# Patient Record
Sex: MALE | Race: WHITE | NOT HISPANIC OR LATINO | Employment: FULL TIME | URBAN - METROPOLITAN AREA
[De-identification: names, ages, dates, MRNs, and addresses within clinical notes are randomized per-mention and may not be internally consistent; named-entity substitution may affect disease eponyms.]

---

## 2021-09-30 ENCOUNTER — APPOINTMENT (OUTPATIENT)
Dept: CT IMAGING | Facility: CLINIC | Age: 46
End: 2021-09-30
Attending: PHYSICIAN ASSISTANT
Payer: COMMERCIAL

## 2021-09-30 ENCOUNTER — HOSPITAL ENCOUNTER (EMERGENCY)
Facility: CLINIC | Age: 46
Discharge: HOME | End: 2021-09-30
Attending: PHYSICIAN ASSISTANT | Admitting: PHYSICIAN ASSISTANT
Payer: COMMERCIAL

## 2021-09-30 VITALS
OXYGEN SATURATION: 98 % | RESPIRATION RATE: 18 BRPM | SYSTOLIC BLOOD PRESSURE: 139 MMHG | HEART RATE: 62 BPM | TEMPERATURE: 97.7 F | DIASTOLIC BLOOD PRESSURE: 83 MMHG

## 2021-09-30 DIAGNOSIS — N20.1 URETERAL STONE: ICD-10-CM

## 2021-09-30 LAB
ALBUMIN UR-MCNC: 10 MG/DL
ANION GAP SERPL CALCULATED.3IONS-SCNC: 2 MMOL/L (ref 3–14)
APPEARANCE UR: CLEAR
BASOPHILS # BLD AUTO: 0 10E3/UL (ref 0–0.2)
BASOPHILS NFR BLD AUTO: 0 %
BILIRUB UR QL STRIP: NEGATIVE
BUN SERPL-MCNC: 20 MG/DL (ref 7–30)
CALCIUM SERPL-MCNC: 9.3 MG/DL (ref 8.5–10.1)
CHLORIDE BLD-SCNC: 106 MMOL/L (ref 94–109)
CO2 SERPL-SCNC: 32 MMOL/L (ref 20–32)
COLOR UR AUTO: YELLOW
CREAT SERPL-MCNC: 1.31 MG/DL (ref 0.66–1.25)
EOSINOPHIL # BLD AUTO: 0 10E3/UL (ref 0–0.7)
EOSINOPHIL NFR BLD AUTO: 0 %
ERYTHROCYTE [DISTWIDTH] IN BLOOD BY AUTOMATED COUNT: 12.3 % (ref 10–15)
GFR SERPL CREATININE-BSD FRML MDRD: 64 ML/MIN/1.73M2
GLUCOSE BLD-MCNC: 127 MG/DL (ref 70–99)
GLUCOSE UR STRIP-MCNC: NEGATIVE MG/DL
HCT VFR BLD AUTO: 46.6 % (ref 40–53)
HGB BLD-MCNC: 15.4 G/DL (ref 13.3–17.7)
HGB UR QL STRIP: ABNORMAL
HOLD SPECIMEN: NORMAL
HOLD SPECIMEN: NORMAL
HYALINE CASTS: 1 /LPF
IMM GRANULOCYTES # BLD: 0 10E3/UL
IMM GRANULOCYTES NFR BLD: 0 %
KETONES UR STRIP-MCNC: NEGATIVE MG/DL
LEUKOCYTE ESTERASE UR QL STRIP: NEGATIVE
LYMPHOCYTES # BLD AUTO: 0.8 10E3/UL (ref 0.8–5.3)
LYMPHOCYTES NFR BLD AUTO: 7 %
MCH RBC QN AUTO: 30.7 PG (ref 26.5–33)
MCHC RBC AUTO-ENTMCNC: 33 G/DL (ref 31.5–36.5)
MCV RBC AUTO: 93 FL (ref 78–100)
MONOCYTES # BLD AUTO: 0.8 10E3/UL (ref 0–1.3)
MONOCYTES NFR BLD AUTO: 7 %
MUCOUS THREADS #/AREA URNS LPF: PRESENT /LPF
NEUTROPHILS # BLD AUTO: 9.1 10E3/UL (ref 1.6–8.3)
NEUTROPHILS NFR BLD AUTO: 86 %
NITRATE UR QL: NEGATIVE
NRBC # BLD AUTO: 0 10E3/UL
NRBC BLD AUTO-RTO: 0 /100
PH UR STRIP: 6.5 [PH] (ref 5–7)
PLATELET # BLD AUTO: 263 10E3/UL (ref 150–450)
POTASSIUM BLD-SCNC: 4.1 MMOL/L (ref 3.4–5.3)
RBC # BLD AUTO: 5.01 10E6/UL (ref 4.4–5.9)
RBC URINE: 148 /HPF
SODIUM SERPL-SCNC: 140 MMOL/L (ref 133–144)
SP GR UR STRIP: 1.02 (ref 1–1.03)
UROBILINOGEN UR STRIP-MCNC: NORMAL MG/DL
WBC # BLD AUTO: 10.7 10E3/UL (ref 4–11)
WBC URINE: 4 /HPF

## 2021-09-30 PROCEDURE — 85025 COMPLETE CBC W/AUTO DIFF WBC: CPT | Performed by: EMERGENCY MEDICINE

## 2021-09-30 PROCEDURE — 81001 URINALYSIS AUTO W/SCOPE: CPT | Performed by: PHYSICIAN ASSISTANT

## 2021-09-30 PROCEDURE — 36415 COLL VENOUS BLD VENIPUNCTURE: CPT | Performed by: EMERGENCY MEDICINE

## 2021-09-30 PROCEDURE — 258N000003 HC RX IP 258 OP 636: Performed by: PHYSICIAN ASSISTANT

## 2021-09-30 PROCEDURE — 85025 COMPLETE CBC W/AUTO DIFF WBC: CPT | Performed by: PHYSICIAN ASSISTANT

## 2021-09-30 PROCEDURE — 80048 BASIC METABOLIC PNL TOTAL CA: CPT | Performed by: EMERGENCY MEDICINE

## 2021-09-30 PROCEDURE — 74176 CT ABD & PELVIS W/O CONTRAST: CPT

## 2021-09-30 PROCEDURE — 80048 BASIC METABOLIC PNL TOTAL CA: CPT | Performed by: PHYSICIAN ASSISTANT

## 2021-09-30 PROCEDURE — 96361 HYDRATE IV INFUSION ADD-ON: CPT

## 2021-09-30 PROCEDURE — 96374 THER/PROPH/DIAG INJ IV PUSH: CPT

## 2021-09-30 PROCEDURE — 81001 URINALYSIS AUTO W/SCOPE: CPT | Performed by: EMERGENCY MEDICINE

## 2021-09-30 PROCEDURE — 99284 EMERGENCY DEPT VISIT MOD MDM: CPT | Mod: 25

## 2021-09-30 PROCEDURE — 250N000011 HC RX IP 250 OP 636: Performed by: PHYSICIAN ASSISTANT

## 2021-09-30 RX ORDER — HYDROCODONE BITARTRATE AND ACETAMINOPHEN 5; 325 MG/1; MG/1
1 TABLET ORAL EVERY 6 HOURS PRN
Qty: 10 TABLET | Refills: 0 | Status: SHIPPED | OUTPATIENT
Start: 2021-09-30 | End: 2021-10-03

## 2021-09-30 RX ORDER — KETOROLAC TROMETHAMINE 15 MG/ML
15 INJECTION, SOLUTION INTRAMUSCULAR; INTRAVENOUS ONCE
Status: COMPLETED | OUTPATIENT
Start: 2021-09-30 | End: 2021-09-30

## 2021-09-30 RX ORDER — KETOROLAC TROMETHAMINE 10 MG/1
10 TABLET, FILM COATED ORAL EVERY 6 HOURS PRN
Qty: 15 TABLET | Refills: 0 | Status: SHIPPED | OUTPATIENT
Start: 2021-09-30 | End: 2022-05-09

## 2021-09-30 RX ORDER — TAMSULOSIN HYDROCHLORIDE 0.4 MG/1
0.4 CAPSULE ORAL DAILY
Qty: 10 CAPSULE | Refills: 0 | Status: SHIPPED | OUTPATIENT
Start: 2021-09-30 | End: 2021-10-06

## 2021-09-30 RX ORDER — ONDANSETRON 4 MG/1
4 TABLET, ORALLY DISINTEGRATING ORAL EVERY 8 HOURS PRN
Qty: 10 TABLET | Refills: 0 | Status: SHIPPED | OUTPATIENT
Start: 2021-09-30 | End: 2022-05-09

## 2021-09-30 RX ADMIN — KETOROLAC TROMETHAMINE 15 MG: 15 INJECTION, SOLUTION INTRAMUSCULAR; INTRAVENOUS at 16:59

## 2021-09-30 RX ADMIN — SODIUM CHLORIDE 1000 ML: 9 INJECTION, SOLUTION INTRAVENOUS at 16:59

## 2021-09-30 ASSESSMENT — ENCOUNTER SYMPTOMS
FEVER: 0
ABDOMINAL PAIN: 1
FLANK PAIN: 0
CHILLS: 0
DYSURIA: 0
FREQUENCY: 0

## 2021-09-30 NOTE — H&P (VIEW-ONLY)
History   Chief Complaint:  Abdominal Pain       HPI   Jewel Madsen is a 46 year old male who presents with abdominal pain. The patient reports sharp right sided abdominal pain that started at 1100 today. At 1300 he took Miralax with no relief. Patient has no history of kidney stones. Denies any fever, chills, scrotal pain, penile pain, flank pain, or urinary symptoms.    Review of Systems   Constitutional: Negative for chills and fever.   Gastrointestinal: Positive for abdominal pain.   Genitourinary: Negative for dysuria, flank pain, frequency, penile pain and testicular pain.   All other systems reviewed and are negative.    Allergies:  The patient has no known allergies.     Medications:  The patient is currently on no regular medications.    Past Medical History:    The patient denies any significant past medical history.    Family History:    Mother: HTN    Social History:  The patient was accompanied to the ED by spouse.  Marital status:     Physical Exam     Patient Vitals for the past 24 hrs:   BP Temp Temp src Pulse Resp SpO2   09/30/21 1800 139/83 -- -- -- -- 98 %   09/30/21 1700 (!) 161/100 -- -- 62 -- 99 %   09/30/21 1655 (!) 166/102 -- -- -- -- --   09/30/21 1455 (!) 159/107 97.7  F (36.5  C) Oral 70 18 99 %       Physical Exam  General: Alert and interactive. Appears well. Cooperative and pleasant.   Eyes: The pupils are equal and round. EOMs intact. No scleral icterus.  ENT: No abnormalities to the external nose or ears. Mucous membranes moist. Posterior oropharynx is non-erythematous.  Neck: Trachea is in the midline. No nuchal rigidity.     CV: Regular rate and rhythm. S1 and S2 normal without murmur, click, gallop or rub.   Resp: Breath sounds are clear bilaterally, without rhonchi, wheezes, rales. Non-labored, no retractions or accessory muscle use.     GI: Abdomen is soft without distension. No tenderness to palpation. No peritoneal signs. No CVA tenderness.   MS: Moving all  extremities well. Good muscle tone.   Skin: Warm and dry. No rash or lesions noted.  Neuro: Alert and oriented x 3. No focal neurologic deficits. Good strength and sensation in upper and lower extremities. Psych: Awake. Alert.  Normal affect. Appropriate interactions.  Lymph: No anterior or posterior cervical lymphadenopathy noted.    Emergency Department Course     Imaging:  Abd/pelvis CT no contrast - Stone Protocol  1.  Large 1.0 x 0.7 CM stone in the proximal right ureter near the UPJ with moderate to severe right-sided hydronephrosis. Additional 5 mm stone lower pole right kidney with tiny punctate stone upper pole right kidney. 2 small nonobstructing stones   within the left kidney.    Laboratory:  CBC: WBC: 10.7, HGB: 15.4, PLT: 263  BMP: Anion Gap: 2 (L), Creatinine: 1.31 (H), glucose 127 (H) o/w WNL  UA: Blood: moderate,  Protein Albumin: 10 (!),  RBC: 148 (H),  Mucous: Present,  o/w WNL       Emergency Department Course:    Reviewed:  I reviewed the patient's nursing notes, vitals, past medical history and care everywhere.     Assessments:  1750 I obtained history and examined the patient as noted above.     Interventions:  1659 0.9% NS 1000 mL IV  1659 Toradol 15 mg IV    Disposition:  The patient was discharged to home.     Impression & Plan   CMS Diagnoses: None    Medical Decision Making:  Jewel Madsen is a 46 year old male who presented with abdominal pain consistent with renal colic. Patient had actually already been treated with Toradol when I saw him and was chest pain free. CT confirms a large 1.0 x 0.7 cm stone in the proximal right ureter near the UPJ and additional 5 mm stone lower pole right kidney.  Renal function is barely elevated at 1.31. CT and lab workup show no other alternative etiology that could be causing his symptoms (e.g., AAA, appendicitis, pyelonephritis). There is no fever or convincing evidence of a urinary tract infection. Hs pain is controlled with interventions in the ED  and he is tolerating POs. We discussed options, including admission for pain management and urologic intervention, or follow up with urology as an outpatient, and he much prefers the latter. I will prescribe supportive medications and Flomax to facilitate stone passage. I have advised him to return for uncontrolled pain, vomiting, fever, or any other concerning symptoms. I also advised to strain his urine to look for a stone and submit it to his primary doctor for lab analysis.  Finally, I have advised follow up with urology within 3-5 days. Questions were answered.      Diagnosis:    ICD-10-CM    1. Ureteral stone  N20.1 Adult Urology Referral    Large stone in right UPJ       Discharge Medications:  New Prescriptions    HYDROCODONE-ACETAMINOPHEN (NORCO) 5-325 MG TABLET    Take 1 tablet by mouth every 6 hours as needed for severe pain    KETOROLAC (TORADOL) 10 MG TABLET    Take 1 tablet (10 mg) by mouth every 6 hours as needed for moderate pain    ONDANSETRON (ZOFRAN ODT) 4 MG ODT TAB    Take 1 tablet (4 mg) by mouth every 8 hours as needed for nausea    TAMSULOSIN (FLOMAX) 0.4 MG CAPSULE    Take 1 capsule (0.4 mg) by mouth daily for 10 doses       Scribe Disclosure:  I, Jes Pichardo, am serving as a scribe at 5:55 PM on 9/30/2021 to document services personally performed by Yasmin Seaman PA-C based on my observations and the provider's statements to me.         Yasmin Seaman PA-C  09/30/21 [id number]

## 2021-09-30 NOTE — DISCHARGE INSTRUCTIONS
Take Toradol every six hours for pain.   Take Flomax daily to help passage of stone.   Take Zofran for nausea.   Use Norco for extreme pain.   Call urology ASAP for follow up.   Return immediately for fever/chills.   Discharge Instructions  Kidney Stones    Kidney stones are a common problem that can cause a lot of pain but fortunately are usually not dangerous. Kidney stones form in the kidney and then can cause a blockage (obstruction) of the flow of urine from the kidney which leads to pain. Most patients can manage kidney stones at home (without a hospital stay).  However, sometimes your condition may be worse than it seemed at first, or may get worse with time. Most kidney stones will pass on their own, but occasionally stones may need to be removed by an urologist.    Generally, every Emergency Department visit should have a follow-up clinic visit with either a primary or a specialty clinic/provider. Please follow-up as instructed by your emergency provider today.      Return to the Emergency Department if:  Your pain is not controlled despite the medications provided or recommended.  You are vomiting (throwing up) and cannot keep fluids or medications down.  You develop a fever (>100.4 F).  You feel much more ill or develop new symptoms.  What can I do to help myself?  Be sure to drink plenty of fluids.  If instructed to do so, strain your urine (pee) with the urine strainer you were provided with today. Your stone may look like a grain of sand or a small pebble. Collect any stones in the cup provided and bring to your follow-up appointment.  Staying active is good, and may help the stone to pass. You may do whatever you feel up to doing without restrictions.   Treatment:  Non-steroidal anti-inflammatory drugs (NSAIDs). This includes prescription medicines like Toradol  (ketorolac) and non-prescription medicines like Advil  (ibuprofen) and Nuprin  (ibuprofen) and Naproxen. These pain relievers are very  effective for kidney stones.  Nausea (sick to your stomach) medication.  Nausea and vomiting are common with kidney stones, so your provider may send you home with medicine for this.   Flomax  (tamsulosin). This medicine is sometimes used for men with prostate problems, but also can help kidney stones to pass. Its effectiveness is controversial or questionable so it is prescribed in certain situations. This medicine can lower blood pressure, and you may feel faint/lightheaded, especially when you first stand up. Be sure to get up gradually, sit down if you feel faint, and avoid activity where feeling faint would be dangerous, such as climbing ladders.  If you were given a prescription for medicine here today, be sure to read all of the information (including the package insert) that comes with your prescription.  This will include important information about the medicine, its side effects, and any warnings that you need to know about.  The pharmacist who fills the prescription can provide more information and answer questions you may have about the medicine.  If you have questions or concerns that the pharmacist cannot address, please call or return to the Emergency Department.   Remember that you can always come back to the Emergency Department if you are not able to see your regular provider in the amount of time listed above, if you get any new symptoms, or if there is anything that worries you.

## 2021-09-30 NOTE — ED PROVIDER NOTES
History   Chief Complaint:  Abdominal Pain       HPI   Jewel Madsen is a 46 year old male who presents with abdominal pain. The patient reports sharp right sided abdominal pain that started at 1100 today. At 1300 he took Miralax with no relief. Patient has no history of kidney stones. Denies any fever, chills, scrotal pain, penile pain, flank pain, or urinary symptoms.    Review of Systems   Constitutional: Negative for chills and fever.   Gastrointestinal: Positive for abdominal pain.   Genitourinary: Negative for dysuria, flank pain, frequency, penile pain and testicular pain.   All other systems reviewed and are negative.    Allergies:  The patient has no known allergies.     Medications:  The patient is currently on no regular medications.    Past Medical History:    The patient denies any significant past medical history.    Family History:    Mother: HTN    Social History:  The patient was accompanied to the ED by spouse.  Marital status:     Physical Exam     Patient Vitals for the past 24 hrs:   BP Temp Temp src Pulse Resp SpO2   09/30/21 1800 139/83 -- -- -- -- 98 %   09/30/21 1700 (!) 161/100 -- -- 62 -- 99 %   09/30/21 1655 (!) 166/102 -- -- -- -- --   09/30/21 1455 (!) 159/107 97.7  F (36.5  C) Oral 70 18 99 %       Physical Exam  General: Alert and interactive. Appears well. Cooperative and pleasant.   Eyes: The pupils are equal and round. EOMs intact. No scleral icterus.  ENT: No abnormalities to the external nose or ears. Mucous membranes moist. Posterior oropharynx is non-erythematous.  Neck: Trachea is in the midline. No nuchal rigidity.     CV: Regular rate and rhythm. S1 and S2 normal without murmur, click, gallop or rub.   Resp: Breath sounds are clear bilaterally, without rhonchi, wheezes, rales. Non-labored, no retractions or accessory muscle use.     GI: Abdomen is soft without distension. No tenderness to palpation. No peritoneal signs. No CVA tenderness.   MS: Moving all  extremities well. Good muscle tone.   Skin: Warm and dry. No rash or lesions noted.  Neuro: Alert and oriented x 3. No focal neurologic deficits. Good strength and sensation in upper and lower extremities. Psych: Awake. Alert.  Normal affect. Appropriate interactions.  Lymph: No anterior or posterior cervical lymphadenopathy noted.    Emergency Department Course     Imaging:  Abd/pelvis CT no contrast - Stone Protocol  1.  Large 1.0 x 0.7 CM stone in the proximal right ureter near the UPJ with moderate to severe right-sided hydronephrosis. Additional 5 mm stone lower pole right kidney with tiny punctate stone upper pole right kidney. 2 small nonobstructing stones   within the left kidney.    Laboratory:  CBC: WBC: 10.7, HGB: 15.4, PLT: 263  BMP: Anion Gap: 2 (L), Creatinine: 1.31 (H), glucose 127 (H) o/w WNL  UA: Blood: moderate,  Protein Albumin: 10 (!),  RBC: 148 (H),  Mucous: Present,  o/w WNL       Emergency Department Course:    Reviewed:  I reviewed the patient's nursing notes, vitals, past medical history and care everywhere.     Assessments:  1750 I obtained history and examined the patient as noted above.     Interventions:  1659 0.9% NS 1000 mL IV  1659 Toradol 15 mg IV    Disposition:  The patient was discharged to home.     Impression & Plan   CMS Diagnoses: None    Medical Decision Making:  Jewel Madsen is a 46 year old male who presented with abdominal pain consistent with renal colic. Patient had actually already been treated with Toradol when I saw him and was chest pain free. CT confirms a large 1.0 x 0.7 cm stone in the proximal right ureter near the UPJ and additional 5 mm stone lower pole right kidney.  Renal function is barely elevated at 1.31. CT and lab workup show no other alternative etiology that could be causing his symptoms (e.g., AAA, appendicitis, pyelonephritis). There is no fever or convincing evidence of a urinary tract infection. Hs pain is controlled with interventions in the ED  and he is tolerating POs. We discussed options, including admission for pain management and urologic intervention, or follow up with urology as an outpatient, and he much prefers the latter. I will prescribe supportive medications and Flomax to facilitate stone passage. I have advised him to return for uncontrolled pain, vomiting, fever, or any other concerning symptoms. I also advised to strain his urine to look for a stone and submit it to his primary doctor for lab analysis.  Finally, I have advised follow up with urology within 3-5 days. Questions were answered.      Diagnosis:    ICD-10-CM    1. Ureteral stone  N20.1 Adult Urology Referral    Large stone in right UPJ       Discharge Medications:  New Prescriptions    HYDROCODONE-ACETAMINOPHEN (NORCO) 5-325 MG TABLET    Take 1 tablet by mouth every 6 hours as needed for severe pain    KETOROLAC (TORADOL) 10 MG TABLET    Take 1 tablet (10 mg) by mouth every 6 hours as needed for moderate pain    ONDANSETRON (ZOFRAN ODT) 4 MG ODT TAB    Take 1 tablet (4 mg) by mouth every 8 hours as needed for nausea    TAMSULOSIN (FLOMAX) 0.4 MG CAPSULE    Take 1 capsule (0.4 mg) by mouth daily for 10 doses       Scribe Disclosure:  I, Jes Pichardo, am serving as a scribe at 5:55 PM on 9/30/2021 to document services personally performed by Yasmin Seaman PA-C based on my observations and the provider's statements to me.         Yasmin Seaman PA-C  09/30/21 [id number]

## 2021-10-04 ENCOUNTER — OFFICE VISIT (OUTPATIENT)
Dept: UROLOGY | Facility: CLINIC | Age: 46
End: 2021-10-04
Payer: COMMERCIAL

## 2021-10-04 ENCOUNTER — LAB (OUTPATIENT)
Dept: URGENT CARE | Facility: URGENT CARE | Age: 46
End: 2021-10-04
Attending: UROLOGY
Payer: COMMERCIAL

## 2021-10-04 ENCOUNTER — TELEPHONE (OUTPATIENT)
Dept: UROLOGY | Facility: CLINIC | Age: 46
End: 2021-10-04

## 2021-10-04 VITALS
HEART RATE: 62 BPM | WEIGHT: 220 LBS | DIASTOLIC BLOOD PRESSURE: 80 MMHG | SYSTOLIC BLOOD PRESSURE: 140 MMHG | BODY MASS INDEX: 29.8 KG/M2 | HEIGHT: 72 IN

## 2021-10-04 DIAGNOSIS — N20.1 URETERAL STONE: ICD-10-CM

## 2021-10-04 DIAGNOSIS — N20.0 CALCULUS OF KIDNEY: Primary | ICD-10-CM

## 2021-10-04 DIAGNOSIS — N20.0 CALCULUS OF KIDNEY: ICD-10-CM

## 2021-10-04 LAB
ALBUMIN UR-MCNC: NEGATIVE MG/DL
APPEARANCE UR: CLEAR
BILIRUB UR QL STRIP: NEGATIVE
COLOR UR AUTO: YELLOW
GLUCOSE UR STRIP-MCNC: NEGATIVE MG/DL
HGB UR QL STRIP: ABNORMAL
KETONES UR STRIP-MCNC: NEGATIVE MG/DL
LEUKOCYTE ESTERASE UR QL STRIP: ABNORMAL
NITRATE UR QL: NEGATIVE
PH UR STRIP: 5.5 [PH] (ref 5–7)
SARS-COV-2 RNA RESP QL NAA+PROBE: NEGATIVE
SP GR UR STRIP: 1.02 (ref 1–1.03)
UROBILINOGEN UR STRIP-ACNC: 0.2 E.U./DL

## 2021-10-04 PROCEDURE — U0003 INFECTIOUS AGENT DETECTION BY NUCLEIC ACID (DNA OR RNA); SEVERE ACUTE RESPIRATORY SYNDROME CORONAVIRUS 2 (SARS-COV-2) (CORONAVIRUS DISEASE [COVID-19]), AMPLIFIED PROBE TECHNIQUE, MAKING USE OF HIGH THROUGHPUT TECHNOLOGIES AS DESCRIBED BY CMS-2020-01-R: HCPCS

## 2021-10-04 PROCEDURE — 87086 URINE CULTURE/COLONY COUNT: CPT | Performed by: UROLOGY

## 2021-10-04 PROCEDURE — U0005 INFEC AGEN DETEC AMPLI PROBE: HCPCS

## 2021-10-04 PROCEDURE — 81003 URINALYSIS AUTO W/O SCOPE: CPT | Mod: QW | Performed by: UROLOGY

## 2021-10-04 PROCEDURE — 99204 OFFICE O/P NEW MOD 45 MIN: CPT | Performed by: UROLOGY

## 2021-10-04 RX ORDER — NITROFURANTOIN 25; 75 MG/1; MG/1
100 CAPSULE ORAL 2 TIMES DAILY
Qty: 14 CAPSULE | Refills: 0 | Status: SHIPPED | OUTPATIENT
Start: 2021-10-04 | End: 2021-10-11

## 2021-10-04 ASSESSMENT — MIFFLIN-ST. JEOR: SCORE: 1910.91

## 2021-10-04 ASSESSMENT — PAIN SCALES - GENERAL: PAINLEVEL: MILD PAIN (2)

## 2021-10-04 NOTE — PROGRESS NOTES
Name: Jewel Madsen   MRN: [id number]  YOB: 1975    Assessment and Plan:  46 year old male with a large right ureteral stone that is unlikely to pass.     1. Ureteral stone  - Case Request: CYSTOURETEROSCOPY, WITH LITHOTRIPSY USING LASER AND URETERAL STENT INSERTION; Standing  - Case Request: CYSTOURETEROSCOPY, WITH LITHOTRIPSY USING LASER AND URETERAL STENT INSERTION  - Asymptomatic COVID-19 Virus (Coronavirus) by PCR; Future  - nitroFURantoin macrocrystal-monohydrate (MACROBID) 100 MG capsule; Take 1 capsule (100 mg) by mouth 2 times daily for 7 days  Dispense: 14 capsule; Refill: 0  - Urine Culture Aerobic Bacterial; Future  - Urine Culture Aerobic Bacterial  2. Calculus of kidney  Discussed the options with the patient including trial of passage, shockwave lithotripsy and ureteroscopy.  Discussed pros and cons of all these approaches.  Discussed with patient that rate of stone passage would be low for the stone.  Regarding ureteroscopy, Discussed risks, including but not limited to, bleeding, infection, need for stent/stent related symptoms, injury to ureter, need for second procedure.     Plan:  right ureteroscopy with laser lithotripsy and stent placement [place] [facility] [place]  Urine culture today  Start prophylactic Macrobid today    Total time spent of 45 minutes including patient time, coordination of care, chart review, documentation.         Chief Complaint: 1 cm right ureteral stone    History of Present Illness:  Jewel Madsen is a 46 year old male seen in consultation from Dr. Seaman for discussion his renal stones.      The current episode was first found due to right renal colic.  This led to evaluation in the in the emergency department which demonstrated a 7 x 10 mm proximal right ureteral stone with significant upstream hydronephrosis and perinephric stranding..  Imaging (CT scan without contrast) was performed on September 30 (images personally reviewed) which  demonstrated:  Right Kidney: 7 x 10 mm ureteral stone in the proximal ureter, 6 mm lower pole nonobstructing stone  Left Kidney: Two 1 to 2 mm calyceal tip stones  Additional relevant findings: None    Currently, they are experiencing minimal flank pain, no nausea, no vomiting, no hematuria, or no dysuria.  They have not experienced recent stone passage.  They have required no time off work due to current stone episode. They are currently utilizing the following medications for pain: Tamsulosin.        I reviewed internal records which in summarized above.    I reviewed internal labs, of which pertinent ones include: Hgb 15.4, Cr 1.31, Ca 9.3, urine pH 5.5, leukocyte esterase trace, significant blood on dipstick analysis    Pertinent stone history:  -- Personal stone history  -- Prior stone procedure  -- Prior stone analysis  -- Prior metabolic evaluation  -- Family stone history    Pertinent stone medical history  -- Gastric bypass surgery  -- Sarcoidosis  -- Inflammatory bowel disease  -- History of non-stone  surgery   -- Neurologic disease limiting mobility  -- Osteoporosis  -- Diabetes  -- Gout    Pertinent medications:  -- Antiplatelet  -- Anticoagulant  -- Topiramate   -- Thiazaide  -- Potassium supplement         Past Medical History:  History reviewed. No pertinent past medical history.         Past Surgical History:  Past Surgical History:   Procedure Laterality Date     NOSE SURGERY       TOE SURGERY              Social History:  Social History     Tobacco Use     Smoking status: Never Smoker     Smokeless tobacco: Never Used   Substance Use Topics     Alcohol use: Yes     Comment: ocass     Drug use: Never            Family History:  Family History   Problem Relation Age of Onset     Hypertension Mother      Cancer Maternal Grandmother      Cancer Maternal Grandfather             Allergies:  No Known Allergies         Medications:  Current Outpatient Medications   Medication Sig     ketorolac (TORADOL)  10 MG tablet Take 1 tablet (10 mg) by mouth every 6 hours as needed for moderate pain     tamsulosin (FLOMAX) 0.4 MG capsule Take 1 capsule (0.4 mg) by mouth daily for 10 doses     ondansetron (ZOFRAN ODT) 4 MG ODT tab Take 1 tablet (4 mg) by mouth every 8 hours as needed for nausea (Patient not taking: Reported on 10/4/2021)     No current facility-administered medications for this visit.       Review of Systems:   ROS: 10 point ROS neg other than the symptoms noted above in the HPI.    Physical Exam:  B/P: 140/80, T: Data Unavailable, P: 62, R: Data Unavailable  Estimated body mass index is 29.84 kg/m  as calculated from the following:    Height as of this encounter: 1.829 m (6').    Weight as of this encounter: 99.8 kg (220 lb).  General: age-appropriate appearing male in NAD.  Resp: no respiratory distress and lung sounds clear.  Back: bony spine is non-tender, flank tenderness: mild on right  Abdomen: no obesity, soft, non- distended, non- tender. No organomegaly.   Neuro: Gross motor intact  Skin: clear of rashes or ecchymoses.     Labs:     Creatinine   Date Value Ref Range Status   09/30/2021 1.31 (H) 0.66 - 1.25 mg/dL Final   03/10/2010 1.09 0.66 - 1.25 mg/dL Final     Comment:     New IDMS-traceable calibration  beginning 5/1/08     No results found for: UA  No components found for: BIJAL Meléndez MD  October 4, 2021

## 2021-10-04 NOTE — CONFIDENTIAL NOTE
Patient is scheduled for surgery with Dr. Meléndez    Spoke with: Dr. Meléndez spoke to patient when he was in clinic face to face 10/04/21    Date of Surgery: Wednesday 10/06/21    Location: [facility]    Informed patient they will need an adult  Yes    Pre op with Provider fernanda    H&P: Scheduled with patient was in ER 09/30/21, Dr. Meléndez will update the H&P day of surgery if needed.     Pre-procedure COVID-19 Test: Tuesday 10/05/21 [facility]     Additional imaging/appointments: na    Surgery packet: optifreight to patient 10/04/21.     Additional comments: na

## 2021-10-04 NOTE — NURSING NOTE
Was seen in Ed last Thursday pm  Dx with kidney stone. No pain at this time . Om Flomax but will need a refill

## 2021-10-04 NOTE — LETTER
10/4/2021       RE: Jewel Madsen  [address]  [facility] [address]     Dear Colleague,    Thank you for referring your patient, Jewel Madsen, to the [facility] [place] at [facility]. Please see a copy of my visit note below.    Name: Jewel Madsen   MRN: [id number]  YOB: 1975    Assessment and Plan:  46 year old male with a large right ureteral stone that is unlikely to pass.     1. Ureteral stone  - Case Request: CYSTOURETEROSCOPY, WITH LITHOTRIPSY USING LASER AND URETERAL STENT INSERTION; Standing  - Case Request: CYSTOURETEROSCOPY, WITH LITHOTRIPSY USING LASER AND URETERAL STENT INSERTION  - Asymptomatic COVID-19 Virus (Coronavirus) by PCR; Future  - nitroFURantoin macrocrystal-monohydrate (MACROBID) 100 MG capsule; Take 1 capsule (100 mg) by mouth 2 times daily for 7 days  Dispense: 14 capsule; Refill: 0  - Urine Culture Aerobic Bacterial; Future  - Urine Culture Aerobic Bacterial  2. Calculus of kidney  Discussed the options with the patient including trial of passage, shockwave lithotripsy and ureteroscopy.  Discussed pros and cons of all these approaches.  Discussed with patient that rate of stone passage would be low for the stone.  Regarding ureteroscopy, Discussed risks, including but not limited to, bleeding, infection, need for stent/stent related symptoms, injury to ureter, need for second procedure.     Plan:  right ureteroscopy with laser lithotripsy and stent placement on Wednesday at the [facility] [place].  Urine culture today  Start prophylactic Macrobid today    Total time spent of 45 minutes including patient time, coordination of care, chart review, documentation.         Chief Complaint: 1 cm right ureteral stone    History of Present Illness:  Jewel Madsen is a 46 year old male seen in consultation from Dr. Seaman for discussion his renal stones.      The current episode was first found due to right renal colic.   This led to evaluation in the in the emergency department which demonstrated a 7 x 10 mm proximal right ureteral stone with significant upstream hydronephrosis and perinephric stranding..  Imaging (CT scan without contrast) was performed on September 30 (images personally reviewed) which demonstrated:  Right Kidney: 7 x 10 mm ureteral stone in the proximal ureter, 6 mm lower pole nonobstructing stone  Left Kidney: Two 1 to 2 mm calyceal tip stones  Additional relevant findings: None    Currently, they are experiencing minimal flank pain, no nausea, no vomiting, no hematuria, or no dysuria.  They have not experienced recent stone passage.  They have required no time off work due to current stone episode. They are currently utilizing the following medications for pain: Tamsulosin.        I reviewed internal records which in summarized above.    I reviewed internal labs, of which pertinent ones include: Hgb 15.4, Cr 1.31, Ca 9.3, urine pH 5.5, leukocyte esterase trace, significant blood on dipstick analysis    Pertinent stone history:  -- Personal stone history  -- Prior stone procedure  -- Prior stone analysis  -- Prior metabolic evaluation  -- Family stone history    Pertinent stone medical history  -- Gastric bypass surgery  -- Sarcoidosis  -- Inflammatory bowel disease  -- History of non-stone  surgery   -- Neurologic disease limiting mobility  -- Osteoporosis  -- Diabetes  -- Gout    Pertinent medications:  -- Antiplatelet  -- Anticoagulant  -- Topiramate   -- Thiazaide  -- Potassium supplement         Past Medical History:  History reviewed. No pertinent past medical history.         Past Surgical History:  Past Surgical History:   Procedure Laterality Date     NOSE SURGERY       TOE SURGERY              Social History:  Social History     Tobacco Use     Smoking status: Never Smoker     Smokeless tobacco: Never Used   Substance Use Topics     Alcohol use: Yes     Comment: ocass     Drug use: Never             Family History:  Family History   Problem Relation Age of Onset     Hypertension Mother      Cancer Maternal Grandmother      Cancer Maternal Grandfather             Allergies:  No Known Allergies         Medications:  Current Outpatient Medications   Medication Sig     ketorolac (TORADOL) 10 MG tablet Take 1 tablet (10 mg) by mouth every 6 hours as needed for moderate pain     tamsulosin (FLOMAX) 0.4 MG capsule Take 1 capsule (0.4 mg) by mouth daily for 10 doses     ondansetron (ZOFRAN ODT) 4 MG ODT tab Take 1 tablet (4 mg) by mouth every 8 hours as needed for nausea (Patient not taking: Reported on 10/4/2021)     No current facility-administered medications for this visit.       Review of Systems:   ROS: 10 point ROS neg other than the symptoms noted above in the HPI.    Physical Exam:  B/P: 140/80, T: Data Unavailable, P: 62, R: Data Unavailable  Estimated body mass index is 29.84 kg/m  as calculated from the following:    Height as of this encounter: 1.829 m (6').    Weight as of this encounter: 99.8 kg (220 lb).  General: age-appropriate appearing male in NAD.  Resp: no respiratory distress and lung sounds clear.  Back: bony spine is non-tender, flank tenderness: mild on right  Abdomen: no obesity, soft, non- distended, non- tender. No organomegaly.   Neuro: Gross motor intact  Skin: clear of rashes or ecchymoses.     Labs:     Creatinine   Date Value Ref Range Status   09/30/2021 1.31 (H) 0.66 - 1.25 mg/dL Final   03/10/2010 1.09 0.66 - 1.25 mg/dL Final     Comment:     New IDMS-traceable calibration  beginning 5/1/08     No results found for: UA  No components found for: UC          Pierre Meléndez MD  October 4, 2021

## 2021-10-05 ENCOUNTER — ANESTHESIA EVENT (OUTPATIENT)
Dept: SURGERY | Facility: AMBULATORY SURGERY CENTER | Age: 46
End: 2021-10-05
Payer: COMMERCIAL

## 2021-10-05 LAB — BACTERIA UR CULT: NO GROWTH

## 2021-10-06 ENCOUNTER — HOSPITAL ENCOUNTER (OUTPATIENT)
Facility: AMBULATORY SURGERY CENTER | Age: 46
End: 2021-10-06
Attending: UROLOGY
Payer: COMMERCIAL

## 2021-10-06 ENCOUNTER — ANESTHESIA (OUTPATIENT)
Dept: SURGERY | Facility: AMBULATORY SURGERY CENTER | Age: 46
End: 2021-10-06
Payer: COMMERCIAL

## 2021-10-06 ENCOUNTER — ANCILLARY PROCEDURE (OUTPATIENT)
Dept: RADIOLOGY | Facility: AMBULATORY SURGERY CENTER | Age: 46
End: 2021-10-06
Attending: UROLOGY
Payer: COMMERCIAL

## 2021-10-06 VITALS
HEART RATE: 63 BPM | TEMPERATURE: 97.3 F | HEIGHT: 72 IN | WEIGHT: 220 LBS | SYSTOLIC BLOOD PRESSURE: 144 MMHG | OXYGEN SATURATION: 97 % | RESPIRATION RATE: 18 BRPM | DIASTOLIC BLOOD PRESSURE: 96 MMHG | BODY MASS INDEX: 29.8 KG/M2

## 2021-10-06 DIAGNOSIS — N20.1 URETERAL STONE: ICD-10-CM

## 2021-10-06 DIAGNOSIS — R52 PAIN: ICD-10-CM

## 2021-10-06 DIAGNOSIS — N20.0 CALCULUS OF KIDNEY: ICD-10-CM

## 2021-10-06 PROCEDURE — 52356 CYSTO/URETERO W/LITHOTRIPSY: CPT | Mod: RT | Performed by: UROLOGY

## 2021-10-06 PROCEDURE — 52356 CYSTO/URETERO W/LITHOTRIPSY: CPT | Mod: RT

## 2021-10-06 PROCEDURE — 74420 UROGRAPHY RTRGR +-KUB: CPT | Mod: TC

## 2021-10-06 PROCEDURE — 74420 UROGRAPHY RTRGR +-KUB: CPT | Mod: 26 | Performed by: UROLOGY

## 2021-10-06 DEVICE — STENT URETERAL PERCUFLEX PLUS 6FRX26CM M0061752630: Type: IMPLANTABLE DEVICE | Site: URETER | Status: FUNCTIONAL

## 2021-10-06 RX ORDER — GABAPENTIN 300 MG/1
300 CAPSULE ORAL
Status: COMPLETED | OUTPATIENT
Start: 2021-10-06 | End: 2021-10-06

## 2021-10-06 RX ORDER — SODIUM CHLORIDE, SODIUM LACTATE, POTASSIUM CHLORIDE, CALCIUM CHLORIDE 600; 310; 30; 20 MG/100ML; MG/100ML; MG/100ML; MG/100ML
INJECTION, SOLUTION INTRAVENOUS CONTINUOUS
Status: DISCONTINUED | OUTPATIENT
Start: 2021-10-06 | End: 2021-10-06 | Stop reason: HOSPADM

## 2021-10-06 RX ORDER — KETOROLAC TROMETHAMINE 30 MG/ML
INJECTION, SOLUTION INTRAMUSCULAR; INTRAVENOUS PRN
Status: DISCONTINUED | OUTPATIENT
Start: 2021-10-06 | End: 2021-10-06

## 2021-10-06 RX ORDER — ONDANSETRON 2 MG/ML
INJECTION INTRAMUSCULAR; INTRAVENOUS PRN
Status: DISCONTINUED | OUTPATIENT
Start: 2021-10-06 | End: 2021-10-06

## 2021-10-06 RX ORDER — LIDOCAINE HYDROCHLORIDE 20 MG/ML
INJECTION, SOLUTION INFILTRATION; PERINEURAL PRN
Status: DISCONTINUED | OUTPATIENT
Start: 2021-10-06 | End: 2021-10-06

## 2021-10-06 RX ORDER — PROPOFOL 10 MG/ML
INJECTION, EMULSION INTRAVENOUS PRN
Status: DISCONTINUED | OUTPATIENT
Start: 2021-10-06 | End: 2021-10-06

## 2021-10-06 RX ORDER — CEFAZOLIN SODIUM 2 G/50ML
2 SOLUTION INTRAVENOUS
Status: COMPLETED | OUTPATIENT
Start: 2021-10-06 | End: 2021-10-06

## 2021-10-06 RX ORDER — ATROPA BELLADONNA AND OPIUM 16.2; 3 MG/1; MG/1
SUPPOSITORY RECTAL PRN
Status: DISCONTINUED | OUTPATIENT
Start: 2021-10-06 | End: 2021-10-06 | Stop reason: HOSPADM

## 2021-10-06 RX ORDER — OXYCODONE HYDROCHLORIDE 5 MG/1
5 TABLET ORAL EVERY 4 HOURS PRN
Status: DISCONTINUED | OUTPATIENT
Start: 2021-10-06 | End: 2021-10-07 | Stop reason: HOSPADM

## 2021-10-06 RX ORDER — FENTANYL CITRATE 50 UG/ML
INJECTION, SOLUTION INTRAMUSCULAR; INTRAVENOUS PRN
Status: DISCONTINUED | OUTPATIENT
Start: 2021-10-06 | End: 2021-10-06

## 2021-10-06 RX ORDER — FENTANYL CITRATE 50 UG/ML
25-50 INJECTION, SOLUTION INTRAMUSCULAR; INTRAVENOUS EVERY 5 MIN PRN
Status: DISCONTINUED | OUTPATIENT
Start: 2021-10-06 | End: 2021-10-06 | Stop reason: HOSPADM

## 2021-10-06 RX ORDER — LIDOCAINE 40 MG/G
CREAM TOPICAL
Status: DISCONTINUED | OUTPATIENT
Start: 2021-10-06 | End: 2021-10-06 | Stop reason: HOSPADM

## 2021-10-06 RX ORDER — TOLTERODINE 2 MG/1
2 CAPSULE, EXTENDED RELEASE ORAL DAILY
Qty: 30 CAPSULE | Refills: 0 | Status: SHIPPED | OUTPATIENT
Start: 2021-10-06 | End: 2022-05-09

## 2021-10-06 RX ORDER — ONDANSETRON 2 MG/ML
4 INJECTION INTRAMUSCULAR; INTRAVENOUS EVERY 30 MIN PRN
Status: DISCONTINUED | OUTPATIENT
Start: 2021-10-06 | End: 2021-10-07 | Stop reason: HOSPADM

## 2021-10-06 RX ORDER — SODIUM CHLORIDE, SODIUM LACTATE, POTASSIUM CHLORIDE, CALCIUM CHLORIDE 600; 310; 30; 20 MG/100ML; MG/100ML; MG/100ML; MG/100ML
INJECTION, SOLUTION INTRAVENOUS CONTINUOUS
Status: DISCONTINUED | OUTPATIENT
Start: 2021-10-06 | End: 2021-10-07 | Stop reason: HOSPADM

## 2021-10-06 RX ORDER — MEPERIDINE HYDROCHLORIDE 25 MG/ML
12.5 INJECTION INTRAMUSCULAR; INTRAVENOUS; SUBCUTANEOUS
Status: DISCONTINUED | OUTPATIENT
Start: 2021-10-06 | End: 2021-10-07 | Stop reason: HOSPADM

## 2021-10-06 RX ORDER — CEFAZOLIN SODIUM 2 G/50ML
2 SOLUTION INTRAVENOUS SEE ADMIN INSTRUCTIONS
Status: DISCONTINUED | OUTPATIENT
Start: 2021-10-06 | End: 2021-10-06 | Stop reason: HOSPADM

## 2021-10-06 RX ORDER — ONDANSETRON 4 MG/1
4 TABLET, ORALLY DISINTEGRATING ORAL EVERY 30 MIN PRN
Status: DISCONTINUED | OUTPATIENT
Start: 2021-10-06 | End: 2021-10-07 | Stop reason: HOSPADM

## 2021-10-06 RX ORDER — DEXAMETHASONE SODIUM PHOSPHATE 4 MG/ML
INJECTION, SOLUTION INTRA-ARTICULAR; INTRALESIONAL; INTRAMUSCULAR; INTRAVENOUS; SOFT TISSUE PRN
Status: DISCONTINUED | OUTPATIENT
Start: 2021-10-06 | End: 2021-10-06

## 2021-10-06 RX ORDER — ACETAMINOPHEN 500 MG
500-1000 TABLET ORAL EVERY 6 HOURS PRN
Qty: 30 TABLET | Refills: 0 | Status: SHIPPED | OUTPATIENT
Start: 2021-10-06 | End: 2022-05-09

## 2021-10-06 RX ORDER — TAMSULOSIN HYDROCHLORIDE 0.4 MG/1
0.4 CAPSULE ORAL DAILY
Qty: 10 CAPSULE | Refills: 1 | Status: SHIPPED | OUTPATIENT
Start: 2021-10-06 | End: 2022-05-09

## 2021-10-06 RX ORDER — DICLOFENAC POTASSIUM 50 MG/1
50 TABLET, FILM COATED ORAL 3 TIMES DAILY PRN
Qty: 30 TABLET | Refills: 0 | Status: SHIPPED | OUTPATIENT
Start: 2021-10-06 | End: 2022-05-09

## 2021-10-06 RX ORDER — FENTANYL CITRATE 50 UG/ML
25-50 INJECTION, SOLUTION INTRAMUSCULAR; INTRAVENOUS
Status: DISCONTINUED | OUTPATIENT
Start: 2021-10-06 | End: 2021-10-07 | Stop reason: HOSPADM

## 2021-10-06 RX ORDER — HYDROMORPHONE HYDROCHLORIDE 1 MG/ML
0.2 INJECTION, SOLUTION INTRAMUSCULAR; INTRAVENOUS; SUBCUTANEOUS EVERY 5 MIN PRN
Status: DISCONTINUED | OUTPATIENT
Start: 2021-10-06 | End: 2021-10-06 | Stop reason: HOSPADM

## 2021-10-06 RX ORDER — DOCUSATE SODIUM 100 MG/1
100 CAPSULE, LIQUID FILLED ORAL 2 TIMES DAILY PRN
Qty: 30 CAPSULE | Refills: 0 | Status: SHIPPED | OUTPATIENT
Start: 2021-10-06 | End: 2022-05-09

## 2021-10-06 RX ORDER — OXYCODONE HYDROCHLORIDE 5 MG/1
5 TABLET ORAL EVERY 6 HOURS PRN
Qty: 6 TABLET | Refills: 0 | Status: SHIPPED | OUTPATIENT
Start: 2021-10-06 | End: 2022-05-09

## 2021-10-06 RX ORDER — PROPOFOL 10 MG/ML
INJECTION, EMULSION INTRAVENOUS CONTINUOUS PRN
Status: DISCONTINUED | OUTPATIENT
Start: 2021-10-06 | End: 2021-10-06

## 2021-10-06 RX ORDER — ACETAMINOPHEN 325 MG/1
975 TABLET ORAL ONCE
Status: COMPLETED | OUTPATIENT
Start: 2021-10-06 | End: 2021-10-06

## 2021-10-06 RX ADMIN — FENTANYL CITRATE 100 MCG: 50 INJECTION, SOLUTION INTRAMUSCULAR; INTRAVENOUS at 13:10

## 2021-10-06 RX ADMIN — GABAPENTIN 300 MG: 300 CAPSULE ORAL at 12:13

## 2021-10-06 RX ADMIN — LIDOCAINE HYDROCHLORIDE 100 MG: 20 INJECTION, SOLUTION INFILTRATION; PERINEURAL at 13:06

## 2021-10-06 RX ADMIN — CEFAZOLIN SODIUM 2 G: 2 SOLUTION INTRAVENOUS at 13:12

## 2021-10-06 RX ADMIN — PROPOFOL 200 MG: 10 INJECTION, EMULSION INTRAVENOUS at 13:06

## 2021-10-06 RX ADMIN — KETOROLAC TROMETHAMINE 15 MG: 30 INJECTION, SOLUTION INTRAMUSCULAR; INTRAVENOUS at 14:47

## 2021-10-06 RX ADMIN — PROPOFOL 150 MCG/KG/MIN: 10 INJECTION, EMULSION INTRAVENOUS at 13:06

## 2021-10-06 RX ADMIN — ACETAMINOPHEN 975 MG: 325 TABLET ORAL at 12:13

## 2021-10-06 RX ADMIN — DEXAMETHASONE SODIUM PHOSPHATE 4 MG: 4 INJECTION, SOLUTION INTRA-ARTICULAR; INTRALESIONAL; INTRAMUSCULAR; INTRAVENOUS; SOFT TISSUE at 13:15

## 2021-10-06 RX ADMIN — ONDANSETRON 4 MG: 2 INJECTION INTRAMUSCULAR; INTRAVENOUS at 13:15

## 2021-10-06 RX ADMIN — SODIUM CHLORIDE, SODIUM LACTATE, POTASSIUM CHLORIDE, CALCIUM CHLORIDE: 600; 310; 30; 20 INJECTION, SOLUTION INTRAVENOUS at 12:17

## 2021-10-06 ASSESSMENT — MIFFLIN-ST. JEOR: SCORE: 1910.91

## 2021-10-06 NOTE — ANESTHESIA CARE TRANSFER NOTE
Patient: Jewel Madsen    Procedure: Procedure(s):  RIGHT CYSTOURETEROSCOPY, WITH LITHOTRIPSY USING LASER AND URETERAL STENT INSERTION       Diagnosis: Ureteral stone [N20.1]  Calculus of kidney [N20.0]  Diagnosis Additional Information: No value filed.    Anesthesia Type:   General     Note:    Oropharynx: oral airway in place  Level of Consciousness: drowsy  Oxygen Supplementation: face mask  Level of Supplemental Oxygen (L/min / FiO2): 6  Independent Airway: airway patency satisfactory and stable  Dentition: dentition unchanged  Vital Signs Stable: post-procedure vital signs reviewed and stable  Report to RN Given: handoff report given  Patient transferred to: PACU  Comments: VSS and WNL, comfortable, no PONV, report to Renetta MOSQUERA  Handoff Report: Identifed the Patient, Identified the Reponsible Provider, Reviewed the pertinent medical history, Discussed the surgical course, Reviewed Intra-OP anesthesia mangement and issues during anesthesia, Set expectations for post-procedure period and Allowed opportunity for questions and acknowledgement of understanding      Vitals:  Vitals Value Taken Time   BP     Temp     Pulse     Resp     SpO2         Electronically Signed By: MILIND Pacheco CRNA  October 6, 2021  3:08 PM

## 2021-10-06 NOTE — ANESTHESIA PREPROCEDURE EVALUATION
Anesthesia Pre-Procedure Evaluation    Patient: Jewel Madsen   MRN: [id number] : 1975        Preoperative Diagnosis: Ureteral stone [N20.1]  Calculus of kidney [N20.0]    Procedure : Procedure(s):  CYSTOURETEROSCOPY, WITH LITHOTRIPSY USING LASER AND URETERAL STENT INSERTION          No past medical history on file.   Past Surgical History:   Procedure Laterality Date     NOSE SURGERY       TOE SURGERY        No Known Allergies   Social History     Tobacco Use     Smoking status: Never Smoker     Smokeless tobacco: Never Used   Substance Use Topics     Alcohol use: Yes     Comment: ocass      Wt Readings from Last 1 Encounters:   10/06/21 99.8 kg (220 lb)        Anesthesia Evaluation   Pt has had prior anesthetic. Type: General.    No history of anesthetic complications       ROS/MED HX  ENT/Pulmonary:  - neg pulmonary ROS     Neurologic:  - neg neurologic ROS     Cardiovascular:  - neg cardiovascular ROS     METS/Exercise Tolerance:     Hematologic:  - neg hematologic  ROS     Musculoskeletal:  - neg musculoskeletal ROS     GI/Hepatic:  - neg GI/hepatic ROS     Renal/Genitourinary:     (+) Nephrolithiasis ,     Endo:  - neg endo ROS     Psychiatric/Substance Use:  - neg psychiatric ROS     Infectious Disease:  - neg infectious disease ROS     Malignancy:       Other:  - neg other ROS          Physical Exam    Airway  airway exam normal           Respiratory Devices and Support         Dental  no notable dental history         Cardiovascular   cardiovascular exam normal          Pulmonary   pulmonary exam normal                OUTSIDE LABS:  CBC:   Lab Results   Component Value Date    WBC 10.7 2021    WBC 4.3 03/10/2010    HGB 15.4 2021    HGB 15.8 03/10/2010    HCT 46.6 2021    HCT 46.9 03/10/2010     2021     03/10/2010     BMP:   Lab Results   Component Value Date     2021     03/10/2010    POTASSIUM 4.1 2021    POTASSIUM 4.5 03/10/2010     CHLORIDE 106 09/30/2021    CHLORIDE 104 03/10/2010    CO2 32 09/30/2021    CO2 27 03/10/2010    BUN 20 09/30/2021    BUN 12 03/10/2010    CR 1.31 (H) 09/30/2021    CR 1.09 03/10/2010     (H) 09/30/2021    GLC 68 03/10/2010     COAGS: No results found for: PTT, INR, FIBR  POC: No results found for: BGM, HCG, HCGS  HEPATIC:   Lab Results   Component Value Date    ALBUMIN 4.7 03/10/2010    PROTTOTAL 7.7 03/10/2010    ALT 27 03/10/2010    AST 29 03/10/2010    ALKPHOS 86 03/10/2010    BILITOTAL 1.7 (H) 03/10/2010     OTHER:   Lab Results   Component Value Date    DEWAYNE 9.3 09/30/2021       Anesthesia Plan    ASA Status:  1   NPO Status:  NPO Appropriate    Anesthesia Type: General.     - Airway: LMA   Induction: Intravenous.   Maintenance: TIVA.        Consents    Anesthesia Plan(s) and associated risks, benefits, and realistic alternatives discussed. Questions answered and patient/representative(s) expressed understanding.     - Discussed with:  Patient         Postoperative Care    Pain management: Oral pain medications.   PONV prophylaxis: Ondansetron (or other 5HT-3), Dexamethasone or Solumedrol     Comments:                Erik Taveras MD, MD

## 2021-10-06 NOTE — OP NOTE
OPERATIVE REPORT 10/6/21    PREOPERATIVE DIAGNOSIS:  Right ureteral stone(s) and Right renal stones(s)    POSTOPERATIVE DIAGNOSIS: Same    PROCEDURES PERFORMED:   Cystourethroscopy  Right ureteroscopy  Right holmium laser lithotripsy  Right retrograde pyelogram  Right ureteral stent placement                 Modifier 22 for severely impacted, dense UPJ stone.    STAFF SURGEON: Pierre Meléndez MD  RESIDENT(S): Elaina Escoto MD    ANESTHESIA: General  ESTIMATED BLOOD LOSS: 1 ml  COMPLICATIONS: None.   SPECIMEN: Stone for analysis   URETERAL STENT(S):  - Right 6 x 26 cm double-J ureteral stent.  Reason for stenting: Other (impacted large UPJ stone).      SIGNIFICANT FINDINGS:   -Stone free with no fragments > 2mm on the right  -Right RPG notable for mild-moderate hydronephrosis, radiopaque UPJ stone, exstrav at level of impacted stone. Small wire puncture of medial UPJ. Stent passed up easily with no resistance    Target stone location:Both    Stone opacity on fluoroscopy: Radiopaque    Approximate target stone size: 10-15 mm    Laser used: Yes    Multiple stones treated: Yes    BRIEF OPERATIVE INDICATIONS: Jewel Madsen is a(n) 46 year old male who presented with acute onset abdominal pain 9/30, found to have large obstructing right UPJ stone.  After a discussion of all risks, benefits, and alternatives, the patient elected to proceed with definitive stone management. The patient understands the potential need for more than one procedure to eliminate all stone burden.      DESCRIPTION OF PROCEDURE:  After informed consent was obtained, the patient was transported to the operating room & placed supine on the table. After adequate anesthesia was induced, the patient was placed in lithotomy and prepped and draped in the usual sterile fashion. A timeout was taken to confirm correct patient, procedure and laterality. Pre-operative IV antibiotics were administered.     A high definition fluoroscopic image  was taken to determine stone opacity. The stone was noted to be radiopaque.    A 22-Turkish rigid cystoscope was inserted into a well-lubricated urethra. The urethra was unremarkable without stricture.     The right ureteral orifice was identified and cannulated with a Sensor wire which was passed up to the proximal ureter under fluoroscopy. A 5 Fr open ended catheter was advanced over the sensor wire and retrograde pyelogram performed. This revealed partial opacification of the renal pelvis and lower pole with obstruction from the large stone. We were unable to pass the sensor beyond the stone.    We then switched to a glidewire which again would not pass beyond the stone. We switched out the 5 Fr for a dual lumen open ended catheter and after several minutes of careful manipulation we were able to get both wires up into the renal pelvis by providing back tension on the ureter with the dual lumen. A second retrograde pyelogram confirmed we were in the renal pelvis beyond the stone, there was a small amount of medial extrav at the level of the stone.    A 46 cm 11/13 ureteral access sheath was advanced over the working wire which was subsequently removed.  A flexible ureteroscope was then introduced into the sheath.  We visualized our safety (glide) wire which appeared to have made a small medial perforation at the UPJ. URS revealed large, severely impacted stone.   A 200 micron laser fiber was used at a setting of 0.8 J and 8 Hz and 0.2 J and 40 Hz and lithotripsy was performed.  This again was difficult due to the dense nature of the stone, likely calcium phosphate. A tipless basket was used to remove all fragments greater than 2 mm.      We performed serial pyeloscopy and encountered a lower pole stone that was adhered to the papilla. This required laser lithotripsy as well. We cleared all fragments from the renal pelvis. Patient was visually stone free of all fragments.    Pullback ureteroscopy showed no residual  stone fragments or significant ureteral injury.    A 6 x 26 cm double-J stent was advanced over the Sensor wire, and a good proximal curl was seen in the renal pelvis on fluoroscopy and the distal curl was seen in the bladder. A string was not left attached to the stent.  The bladder was drained.            The patient tolerated the procedure well and there were no apparent complications. The patient  was transported to the postanesthesia care unit in stable condition.        POSTOP PLAN:  Follow up in clinic in 2 weeks for stent removal, abx for 24 hours prior  Pain control  Imaging follow up 11/18  30-60d follow-up imaging: DUARTE and KUB (HLL for radiopaque stone)      Disposable items prior to timeout:  Sensor wire  5-Latvian open ended catheter.    IPierre, was present for the entire case, including the critical portions.  This case was exceptionally difficult due the impaction of the stone and required additional time as noted above.

## 2021-10-06 NOTE — DISCHARGE INSTRUCTIONS
[facility]  Home Care Following Anesthesia  For 24 hours after surgery:  1. Get plenty of rest.  A responsible adult must stay with you for at least 24 hours after you leave the surgery center.  2. Do not drive or use heavy equipment.  If you have weakness or tingling, don't drive or use heavy equipment until this feeling goes away.   3. Do not drink alcohol.   4. Avoid strenuous or risky activities.  Ask for help when climbing stairs.  5. You may feel lightheaded.  IF so, sit for a few minutes before standing.  Have someone help you get up.   6. If you have nausea (feel sick to your stomach): Drink only clear liquids such as apple juice, ginger ale, broth or 7-Up.  Rest may also help.  Be sure to drink enough fluids.  Move to a regular diet as you feel able.   7. You may have a slight fever.  Call the doctor if your fever is over 100 F (37.7 C) (taken under the tongue) or lasts longer than 24 hours.  8. You may have a dry mouth, a sore throat, muscle aches or trouble sleeping. These should go away after 24 hours.  9. Do not make important or legal decisions.   10. It is recommended to avoid smoking.               Tips for taking pain medications  To get the best pain relief possible, remember these points:    Take pain medications as directed, before pain becomes severe.    Pain medication can upset your stomach: taking it with food may help.    Constipation is a common side effect of pain medication. Drink plenty of  fluids.    Eat foods high in fiber. Take a stool softener if recommended by your doctor or pharmacist.    Do not drink alcohol, drive or operate machinery while taking pain medications.    Ask about other ways to control pain, such as with heat, ice or relaxation.    Tylenol/Acetaminophen Consumption  To help encourage the safe use of acetaminophen, the makers of TYLENOL  have lowered the maximum daily dose for single-ingredient Extra Strength TYLENOL   (acetaminophen) products sold in the [place] from 8 pills per day (4,000 mg) to 6 pills per day (3,000 mg). The dosing interval has also changed from 2 pills every 4-6 hours to 2 pills every 6 hours.    If you feel your pain relief is insufficient, you may take Tylenol/Acetaminophen in addition to your narcotic pain medication.     Be careful not to exceed 3,000 mg of Tylenol/Acetaminophen in a 24 hour period from all sources.    If you are taking extra strength Tylenol/acetaminophen (500 mg), the maximum dose is 6 tablets in 24 hours.    If you are taking regular strength acetaminophen (325 mg), the maximum dose is 9 tablets in 24 hours.    Call a doctor for any of the followin. Signs of infection (fever, growing tenderness at the surgery site, a large amount of drainage or bleeding, severe pain, foul-smelling drainage, redness, swelling).  2. It has been over 8 to 10 hours since surgery and you are still not able to urinate (pass water).  3. Headache for over 24 hours.  4. Numbness, tingling or weakness the day after surgery (if you had spinal anesthesia).  5. Signs of Covid-19 infection (temperature over 100 degrees, shortness of breath, cough, loss of taste/smell, generalized body aches, persistent headache, chills, sore throat, nausea/vomiting/diarrhea)  Your doctor is:  Pierre Meléndez, Prostate and Urology: [phone]                    Or dial [phone] and ask for the resident on call for:  Prostate Urology  For emergency care, call the:  [facility]:  [phone] (TTY for hearing impaired: [phone])

## 2021-10-06 NOTE — BRIEF OP NOTE
[facility]    Brief Operative Note    Pre-operative diagnosis: Ureteral stone [N20.1]  Calculus of kidney [N20.0]  Post-operative diagnosis Same as pre-operative diagnosis    Procedure: Procedure(s):  RIGHT CYSTOURETEROSCOPY, WITH LITHOTRIPSY USING LASER AND URETERAL STENT INSERTION  Surgeon: Surgeon(s) and Role:     * Pierre Meléndez MD - Primary     * Elaina Escoto MD - Resident - Assisting  Anesthesia: General   Estimated Blood Loss: Minimal    Drains: 6 Fr x 26 cm RIGHT double J ureteral stent  Specimens:   ID Type Source Tests Collected by Time Destination   A : Right Ureter and Kidney Stones Calculus/Stone Ureter, Right STONE ANALYSIS Pierre Meléndez MD 10/6/2021  2:30 PM      Findings:   see operative report.  Complications: None.  Implants:   Implant Name Type Inv. Item Serial No.  Lot No. LRB No. Used Action   STENT URETERAL PERCUFLEX PLUS [id number] - [id number] Stent STENT URETERAL PERCUFLEX PLUS [id number]  [employer] [id number] Right 1 Implanted     Plan  Stent removal in clinic in 2 weeks  macrobid 24 hrs prior  Pain control  Home when meeting PACU criteria

## 2021-10-06 NOTE — ANESTHESIA POSTPROCEDURE EVALUATION
Patient: Jewel Madsen    Procedure: Procedure(s):  RIGHT CYSTOURETEROSCOPY, WITH LITHOTRIPSY USING LASER AND URETERAL STENT INSERTION       Diagnosis:Ureteral stone [N20.1]  Calculus of kidney [N20.0]  Diagnosis Additional Information: No value filed.    Anesthesia Type:  General    Note:  Disposition: Outpatient   Postop Pain Control: Uneventful            Sign Out: Well controlled pain   PONV: No   Neuro/Psych: Uneventful            Sign Out: Acceptable/Baseline neuro status   Airway/Respiratory: Uneventful            Sign Out: Acceptable/Baseline resp. status   CV/Hemodynamics: Uneventful            Sign Out: Acceptable CV status; No obvious hypovolemia; No obvious fluid overload   Other NRE: NONE   DID A NON-ROUTINE EVENT OCCUR? No           Last vitals:  Vitals Value Taken Time   /84 10/06/21 1527   Temp 35.9  C (96.6  F) 10/06/21 1527   Pulse 76 10/06/21 1527   Resp 12 10/06/21 1527   SpO2 94 % 10/06/21 1527       Electronically Signed By: Erik Taveras MD, MD  October 6, 2021  4:18 PM

## 2021-10-09 LAB
APPEARANCE STONE: NORMAL
COMPN STONE: NORMAL
NUMBER STONE: NORMAL
SIZE STONE: NORMAL MM
SPECIMEN WT: 130 MG

## 2021-10-11 ENCOUNTER — TELEPHONE (OUTPATIENT)
Dept: UROLOGY | Facility: CLINIC | Age: 46
End: 2021-10-11

## 2021-10-11 DIAGNOSIS — N20.0 CALCULUS OF KIDNEY: Primary | ICD-10-CM

## 2021-10-11 RX ORDER — TAMSULOSIN HYDROCHLORIDE 0.4 MG/1
0.4 CAPSULE ORAL DAILY
Qty: 10 CAPSULE | Refills: 0 | Status: SHIPPED | OUTPATIENT
Start: 2021-10-11 | End: 2022-05-09

## 2021-10-11 NOTE — TELEPHONE ENCOUNTER
[facility] Call Center    Phone Message    May a detailed message be left on voicemail: no     Reason for Call: Medication Question or concern regarding medication   Prescription Clarification  Name of Medication:tamsulosin (FLOMAX) 0.4 MG capsule  Prescribing Provider: Pierre Hernández MD Pharmacy: [facility] [address] [place], [phone]   What on the order needs clarification? Refill Tamsulosin per Pt, Myles is out     Action Taken: Message routed to:  Other: [facility] Clinical/ [facility]    Travel Screening: Not Applicable

## 2021-10-19 ENCOUNTER — OFFICE VISIT (OUTPATIENT)
Dept: UROLOGY | Facility: CLINIC | Age: 46
End: 2021-10-19
Payer: COMMERCIAL

## 2021-10-19 ENCOUNTER — TELEPHONE (OUTPATIENT)
Dept: UROLOGY | Facility: CLINIC | Age: 46
End: 2021-10-19

## 2021-10-19 VITALS
WEIGHT: 220 LBS | BODY MASS INDEX: 29.8 KG/M2 | HEIGHT: 72 IN | OXYGEN SATURATION: 99 % | HEART RATE: 85 BPM | SYSTOLIC BLOOD PRESSURE: 110 MMHG | DIASTOLIC BLOOD PRESSURE: 78 MMHG

## 2021-10-19 DIAGNOSIS — Z96.0 S/P URETERAL STENT PLACEMENT: ICD-10-CM

## 2021-10-19 DIAGNOSIS — N20.0 CALCULUS OF KIDNEY: Primary | ICD-10-CM

## 2021-10-19 DIAGNOSIS — Z79.2 PROPHYLACTIC ANTIBIOTIC: ICD-10-CM

## 2021-10-19 PROCEDURE — 52310 CYSTOSCOPY AND TREATMENT: CPT | Performed by: UROLOGY

## 2021-10-19 RX ORDER — LIDOCAINE HYDROCHLORIDE 20 MG/ML
JELLY TOPICAL ONCE
Status: COMPLETED | OUTPATIENT
Start: 2021-10-19 | End: 2021-10-19

## 2021-10-19 RX ORDER — CIPROFLOXACIN 500 MG/1
500 TABLET, FILM COATED ORAL ONCE
Qty: 1 TABLET | Refills: 0 | Status: SHIPPED | OUTPATIENT
Start: 2021-10-19 | End: 2021-10-19

## 2021-10-19 RX ADMIN — LIDOCAINE HYDROCHLORIDE 5 ML: 20 JELLY TOPICAL at 10:17

## 2021-10-19 ASSESSMENT — MIFFLIN-ST. JEOR: SCORE: 1910.91

## 2021-10-19 ASSESSMENT — PAIN SCALES - GENERAL: PAINLEVEL: NO PAIN (0)

## 2021-10-19 NOTE — PROGRESS NOTES
After obtaining informed consent and administering preoperative antibiotics the patient was prepped and draped in the standard sterile fashion.    The 17F flexible cystoscope was placed through the urethra and into the bladder.    The distal curl of the previously placed stent was identified and grasped with a flexible grasping forceps.     It was pulled from the kidney and out the bladder fully intact.    The patient tolerated the procedure well.       Plan:  Patient will do a Litholink, renal ultrasound, KUB in 6 weeks and then follow-up for a return visit after.

## 2021-10-19 NOTE — LETTER
Date:November 12, 2021      Patient was self referred, no letter generated. Do not send.        [facility] Health Information

## 2021-10-19 NOTE — NURSING NOTE
Chief Complaint   Patient presents with     kidney stones     stent removal today   Prior to the start of the procedure and with procedural staff participation, I verbally confirmed the patient s identity using two indicators, relevant allergies, that the procedure was appropriate and matched the consent or emergent situation, and that the correct equipment/implants were available. Immediately prior to starting the procedure I conducted the Time Out with the procedural staff and re-confirmed the patient s name, procedure, and site/side. I have wiped the patient off with the povidone-Iodine solution, draped them,  used Lidocaine hydrochloride jelly, and instilled sterile water into the bladder. (The Joint Commission universal protocol was followed.)  Yes    Sedation (Moderate or Deep): None  5mL 2% lidocaine hydrochloride Urojet instilled into urethra.    NDC# [address]-8  Lot #: [id number]  Expiration Date:  5-23  Stefanie Coffman LPN

## 2021-10-19 NOTE — TELEPHONE ENCOUNTER
"Orders placed as requested.  DELILAH Thompson RN      ----- Message from Sima Nelson sent at 10/19/2021 10:09 AM CDT -----  This is pt Dr Meléndez just saw in clinic & he wants pt to have \"renal US and KUB\", plz put in orders.  Thank you :)      "

## 2021-10-19 NOTE — PATIENT INSTRUCTIONS
"AFTER YOUR CYSTOSCOPY  ?  ?  You have just completed a cystoscopy, or \"cysto\", which allowed your physician to learn more about your bladder (or to remove a stent placed after surgery). We suggest that you continue to avoid caffeine, fruit juice, and alcohol for the next 24 hours, however, you are encouraged to return to your normal activities.  ?  ?  A few things that are considered normal after your cystoscopy:  ?  * small amount of bleeding (or spotting) that clears within the next 24 hours  ?  * slight burning sensation with urination  ?  * sensation of needing to void (urinate) more frequently  ?  * the feeling of \"air\" in your urine  ?  * mild discomfort that is relieved with Tylenol    * bladder spasms  ?  ?  ?  Please contact our office promptly if you:  ?  * develop a fever above 101 degrees  ?  * are unable to urinate  ?  * develop bright red blood that does not stop  ?  * experience severe pain or swelling  ?  ?  ?  And of course, please contact our office with any concerns or questions [phone]  ?      Please complete the [employer] [employer] 24hr Urine collection in next 1-2 months .    If you do not receive the LithoLink in 7-10 days please call [phone].   Once you complete the kit and return it, and we get the results we will contact you to schedule a follow up appointment if one is not already made.    [facility] is a laboratory that specializes in 24-hour urine testing for kidney stone  formers. Your provider has requested that you complete a Litholink At-Home kit. The  At-Home kit will be shipped directly to your home (or address provided). Your provider  is waiting on these test results in order to start your kidney stone treatment plan.  Things to know about your Litholink At-Home kit:      Expect your At -Home kit to arrive 5-7 business days from the date the order was  placed.  Your At-Home kit will include everything you need to complete your 24-hour urine  collection(s). Detailed instructions, " "collection supplies, return shipping box, and a  pre-paid Fed-Ex label are being sent directly to you.    If you are planning to begin your At-Home kit immediately upon receipt, note the  Followin. Eat and drink normally the day before you start your collection and during  the collection process.    2. Stop taking Vitamin C (pill form, vitamins, and/or supplements) that is  greater than 100 mg per day 5 days prior to the start of your A t-Home kit.  Vitamin C occuring in foods and drinks can be ingested as normal.    Upon completing and returning your A t-Home kit allow 7-10 days for your provider  to receive your Litholink results.    [email or web address]    AFTER YOUR CYSTOSCOPY        You have just completed a cystoscopy, or \"cysto\", which allowed your physician to learn more about your bladder (or to remove a stent placed after surgery). We suggest that you continue to avoid caffeine, fruit juice, and alcohol for the next 24 hours, however, you are encouraged to return to your normal activities.         A few things that are considered normal after your cystoscopy:     * Small amount of bleeding (or spotting) that clears within the next 24 hours     * Slight burning sensation with urination     * Sensation to of needing to avoid more frequently     * The feeling of \"air\" in your urine     * Mild discomfort that is relieved with Tylenol        Please contact our office promptly if you:     * Develop a fever above 101 degrees     * Are unable to urinate     * Develop bright red blood that does not stop     * Severe pain or swelling         Please contact our office with any concerns or questions @[email or web address].  "

## 2021-10-19 NOTE — LETTER
10/19/2021       RE: Jewel Madsen  [address]  [facility] [address]     Dear Colleague,    Thank you for referring your patient, Jewel Madsen, to the [facility]OLOGY [facility] [place] at [facility]. Please see a copy of my visit note below.    After obtaining informed consent and administering preoperative antibiotics the patient was prepped and draped in the standard sterile fashion.    The 17F flexible cystoscope was placed through the urethra and into the bladder.    The distal curl of the previously placed stent was identified and grasped with a flexible grasping forceps.     It was pulled from the kidney and out the bladder fully intact.    The patient tolerated the procedure well.       Plan:  Patient will do a Litholink, renal ultrasound, KUB in 6 weeks and then follow-up for a return visit after.      Again, thank you for allowing me to participate in the care of your patient.      Sincerely,    Pierre Meléndez MD

## 2021-10-23 ENCOUNTER — HEALTH MAINTENANCE LETTER (OUTPATIENT)
Age: 46
End: 2021-10-23

## 2021-11-11 ENCOUNTER — TRANSFERRED RECORDS (OUTPATIENT)
Dept: HEALTH INFORMATION MANAGEMENT | Facility: CLINIC | Age: 46
End: 2021-11-11
Payer: COMMERCIAL

## 2022-01-18 ENCOUNTER — HOSPITAL ENCOUNTER (OUTPATIENT)
Dept: GENERAL RADIOLOGY | Facility: CLINIC | Age: 47
End: 2022-01-18
Attending: UROLOGY
Payer: COMMERCIAL

## 2022-01-18 ENCOUNTER — HOSPITAL ENCOUNTER (OUTPATIENT)
Dept: ULTRASOUND IMAGING | Facility: CLINIC | Age: 47
End: 2022-01-18
Attending: UROLOGY
Payer: COMMERCIAL

## 2022-01-18 DIAGNOSIS — N20.0 CALCULUS OF KIDNEY: ICD-10-CM

## 2022-01-18 PROCEDURE — 74019 RADEX ABDOMEN 2 VIEWS: CPT

## 2022-01-18 PROCEDURE — 76770 US EXAM ABDO BACK WALL COMP: CPT

## 2022-01-23 PROBLEM — Z71.3 DIETARY COUNSELING AND SURVEILLANCE: Status: ACTIVE | Noted: 2022-01-23

## 2022-01-24 ENCOUNTER — VIRTUAL VISIT (OUTPATIENT)
Dept: UROLOGY | Facility: CLINIC | Age: 47
End: 2022-01-24
Payer: COMMERCIAL

## 2022-01-24 VITALS — WEIGHT: 220 LBS | HEIGHT: 72 IN | BODY MASS INDEX: 29.8 KG/M2

## 2022-01-24 DIAGNOSIS — E87.0 HYPERNATRIURIA: ICD-10-CM

## 2022-01-24 DIAGNOSIS — Z71.3 DIETARY COUNSELING AND SURVEILLANCE: ICD-10-CM

## 2022-01-24 DIAGNOSIS — N20.0 CALCULUS OF KIDNEY: Primary | ICD-10-CM

## 2022-01-24 DIAGNOSIS — R82.994 HYPERCALCIURIA: ICD-10-CM

## 2022-01-24 PROCEDURE — 99214 OFFICE O/P EST MOD 30 MIN: CPT | Mod: 24 | Performed by: UROLOGY

## 2022-01-24 ASSESSMENT — PAIN SCALES - GENERAL: PAINLEVEL: NO PAIN (0)

## 2022-01-24 ASSESSMENT — MIFFLIN-ST. JEOR: SCORE: 1910.91

## 2022-01-24 NOTE — LETTER
Date:January 25, 2022      Patient was self referred, no letter generated. Do not send.        [facility] Health Information

## 2022-01-24 NOTE — LETTER
1/24/2022       RE: Jewel Madsen  [address]  [facility] [address]     Dear Colleague,    Thank you for referring your patient, Jewel Madsen, to the [facility] UROLOGY [facility] [place] at [facility]. Please see a copy of my visit note below.    Myles is a 46 year old who is being evaluated via a billable video visit.      How would you like to obtain your AVS? MyChart  If the video visit is dropped, the invitation should be resent by: Text to cell phone: [phone]  Will anyone else be joining your video visit? No      Video Start Time: 8:15AM  Video-Visit Details    Type of service:  Video Visit    Video End Time:8:23 AM    Originating Location (pt. Location): Home    Distant Location (provider location):  [facility] UROLOGY CLINIC [place]     Platform used for Video Visit: [employer]    Name: Jewel Madsen   MRN: [id number]  YOB: 1975    Assessment and Plan:  46 year old male with mixed calcium nephrolithiasis s/p right ureteroscopy for a 1 cm renal pelvis stone.    1. Calculus of kidney  2. Dietary counseling and surveillance  3. Hypercalciuria  4. Hypernatriuria    Discussed possible dietary and medical options to assist with stone prevention.  Based on stone analysis and 24 hr urine test, I counseled regarding:     Consuming taking and recommended dietary calcium 1000 to 1200 mg a day.  Patient currently taking calcium carbonate and vitamin D, suggest that maybe calcium citrate and vitamin D would be better to increase the citrate level some.     Discussed the role of elevated urine sodium causes high urine calcium and risk for stone disease.  This is directly related to intake of dietary sodium.  Goal for sodium intake is <2.5g or 2500 mg daily to help treat the hypercalciuria.    Adequate fruit and vegetable intake, and reduction in animal protein as this is important for further stone prevention.     Plan:  -Dietician consult to  reduce dietary sodium and animal protein  -repeat litholink in 2-3 mo and virtual visit after to discuss results, if urine paramaters not improved will need to consider medication    Pierre Meléndez MD  January 23, 2022         Chief Complaint: Stone follow-up    History of Present Illness:  Jewel Madsen is a 46 year old male seen after right ureteroscopy with laser lithotripsy on 10/6/2021.    Postoperatively:  Stent was removed in the office via cystoscopy. Since surgery, they did not have an unplanned clinic visit, did not visit the emergency department,  did not get readmitted, did not require additional unplanned procedure.    Currently, they are experiencing no flank pain, no nausea, no vomiting, no hematuria, or no dysuria.  They have not experienced recent stone passage.      Imaging (kub/renal US on 1/18/2022, personally reviewed), demonstrated: no residual stones or hydronephrosis    Metabolic:  Medical risk factors: None  Stone analysis: 30 % COM, 20% COD, 50% CAP  Blood work:  Sodium   Date Value Ref Range Status   09/30/2021 140 133 - 144 mmol/L Final   03/10/2010 141 133 - 144 mmol/L Final     Potassium   Date Value Ref Range Status   09/30/2021 4.1 3.4 - 5.3 mmol/L Final   03/10/2010 4.5 3.4 - 5.3 mmol/L Final     Chloride   Date Value Ref Range Status   09/30/2021 106 94 - 109 mmol/L Final   03/10/2010 104 94 - 109 mmol/L Final     Creatinine   Date Value Ref Range Status   09/30/2021 1.31 (H) 0.66 - 1.25 mg/dL Final   03/10/2010 1.09 0.66 - 1.25 mg/dL Final     Comment:     New IDMS-traceable calibration  beginning 5/1/08     Calcium   Date Value Ref Range Status   09/30/2021 9.3 8.5 - 10.1 mg/dL Final   03/10/2010 9.7 8.5 - 10.4 mg/dL Final        24 hr urine study:  + adequate collection  -- Low urine volume (3 L)  + Hypercalciuria (Ca/Cr Ratio 179)  -- Hyperoxaluria  -- Hypocitraturia  + Hypernatriuria  -- Aciduria  + Alkaluria  -- Hypomagnesuria         Allergies:  No Known Allergies          Medications:  Current Outpatient Medications   Medication Sig     acetaminophen (TYLENOL) 500 MG tablet Take 1-2 tablets (500-1,000 mg) by mouth every 6 hours as needed for mild pain (take 1-2 tablets up to every 6 hours as needed. Alternate with diclofenac. Do not take more than 4000 mg in one day)     diclofenac (CATAFLAM) 50 MG tablet Take 1 tablet (50 mg) by mouth 3 times daily as needed for moderate pain (take as needed, alternate with tylenol) (Patient not taking: Reported on 10/19/2021)     docusate sodium (COLACE) 100 MG capsule Take 1 capsule (100 mg) by mouth 2 times daily as needed for constipation (always take if using oxycodone. Otherwise take as needed for one soft bowel movement daily)     docusate sodium (COLACE) 50 MG capsule Take 1 capsule (50 mg) by mouth 2 times daily as needed for constipation (always take if using oxycodone. Otherwise take as needed for one soft bowel movement daily)     ketorolac (TORADOL) 10 MG tablet Take 1 tablet (10 mg) by mouth every 6 hours as needed for moderate pain     ondansetron (ZOFRAN ODT) 4 MG ODT tab Take 1 tablet (4 mg) by mouth every 8 hours as needed for nausea (Patient not taking: Reported on 10/4/2021)     oxyCODONE (ROXICODONE) 5 MG tablet Take 1 tablet (5 mg) by mouth every 6 hours as needed for pain (Patient not taking: Reported on 10/19/2021)     tamsulosin (FLOMAX) 0.4 MG capsule Take 1 capsule (0.4 mg) by mouth daily     tamsulosin (FLOMAX) 0.4 MG capsule Take 1 capsule (0.4 mg) by mouth daily     tolterodine ER (DETROL LA) 2 MG 24 hr capsule Take 1 capsule (2 mg) by mouth daily (Patient not taking: Reported on 10/19/2021)     No current facility-administered medications for this visit.       Review of Systems:   ROS: 10 point ROS neg other than the symptoms noted above in the HPI.    Physical Exam:  B/P: Data Unavailable, T: Data Unavailable, P: Data Unavailable, R: Data Unavailable  Estimated body mass index is 29.84 kg/m  as calculated from the  following:    Height as of 10/19/21: 1.829 m (6').    Weight as of 10/19/21: 99.8 kg (220 lb).  General Appearance Adult: Alert, no acute distress, oriented  Lungs: no respiratory distress, or pursed lip breathing  Neuro: Alert, oriented, speech and mentation normal  Psych: affect and mood normal        Again, thank you for allowing me to participate in the care of your patient.      Sincerely,    Pierre Meléndez MD

## 2022-01-24 NOTE — PROGRESS NOTES
Myles is a 46 year old who is being evaluated via a billable video visit.      How would you like to obtain your AVS? MyChart  If the video visit is dropped, the invitation should be resent by: Text to cell phone: [phone]  Will anyone else be joining your video visit? No      Video Start Time: 8:15AM  Video-Visit Details    Type of service:  Video Visit    Video End Time:8:23 AM    Originating Location (pt. Location): Home    Distant Location (provider location):  [facility] UROLOGY [facility] [place]     Platform used for Video Visit: [employer]    Name: Jewel Madsen   MRN: [id number]  YOB: 1975    Assessment and Plan:  46 year old male with mixed calcium nephrolithiasis s/p right ureteroscopy for a 1 cm renal pelvis stone.    1. Calculus of kidney  2. Dietary counseling and surveillance  3. Hypercalciuria  4. Hypernatriuria    Discussed possible dietary and medical options to assist with stone prevention.  Based on stone analysis and 24 hr urine test, I counseled regarding:     Consuming taking and recommended dietary calcium 1000 to 1200 mg a day.  Patient currently taking calcium carbonate and vitamin D, suggest that maybe calcium citrate and vitamin D would be better to increase the citrate level some.     Discussed the role of elevated urine sodium causes high urine calcium and risk for stone disease.  This is directly related to intake of dietary sodium.  Goal for sodium intake is <2.5g or 2500 mg daily to help treat the hypercalciuria.    Adequate fruit and vegetable intake, and reduction in animal protein as this is important for further stone prevention.     Plan:  -Dietician consult to reduce dietary sodium and animal protein  -repeat litholink in 2-3 mo and virtual visit after to discuss results, if urine paramaters not improved will need to consider medication    Pierre Meléndez MD  January 23, 2022         Chief Complaint: Stone follow-up    History of Present Illness:  Jewel Madsen  is a 46 year old male seen after right ureteroscopy with laser lithotripsy on 10/6/2021.    Postoperatively:  Stent was removed in the office via cystoscopy. Since surgery, they did not have an unplanned clinic visit, did not visit the emergency department,  did not get readmitted, did not require additional unplanned procedure.    Currently, they are experiencing no flank pain, no nausea, no vomiting, no hematuria, or no dysuria.  They have not experienced recent stone passage.      Imaging (kub/renal US on 1/18/2022, personally reviewed), demonstrated: no residual stones or hydronephrosis    Metabolic:  Medical risk factors: None  Stone analysis: 30 % COM, 20% COD, 50% CAP  Blood work:  Sodium   Date Value Ref Range Status   09/30/2021 140 133 - 144 mmol/L Final   03/10/2010 141 133 - 144 mmol/L Final     Potassium   Date Value Ref Range Status   09/30/2021 4.1 3.4 - 5.3 mmol/L Final   03/10/2010 4.5 3.4 - 5.3 mmol/L Final     Chloride   Date Value Ref Range Status   09/30/2021 106 94 - 109 mmol/L Final   03/10/2010 104 94 - 109 mmol/L Final     Creatinine   Date Value Ref Range Status   09/30/2021 1.31 (H) 0.66 - 1.25 mg/dL Final   03/10/2010 1.09 0.66 - 1.25 mg/dL Final     Comment:     New IDMS-traceable calibration  beginning 5/1/08     Calcium   Date Value Ref Range Status   09/30/2021 9.3 8.5 - 10.1 mg/dL Final   03/10/2010 9.7 8.5 - 10.4 mg/dL Final        24 hr urine study:  + adequate collection  -- Low urine volume (3 L)  + Hypercalciuria (Ca/Cr Ratio 179)  -- Hyperoxaluria  -- Hypocitraturia  + Hypernatriuria  -- Aciduria  + Alkaluria  -- Hypomagnesuria         Allergies:  No Known Allergies         Medications:  Current Outpatient Medications   Medication Sig     acetaminophen (TYLENOL) 500 MG tablet Take 1-2 tablets (500-1,000 mg) by mouth every 6 hours as needed for mild pain (take 1-2 tablets up to every 6 hours as needed. Alternate with diclofenac. Do not take more than 4000 mg in one day)      diclofenac (CATAFLAM) 50 MG tablet Take 1 tablet (50 mg) by mouth 3 times daily as needed for moderate pain (take as needed, alternate with tylenol) (Patient not taking: Reported on 10/19/2021)     docusate sodium (COLACE) 100 MG capsule Take 1 capsule (100 mg) by mouth 2 times daily as needed for constipation (always take if using oxycodone. Otherwise take as needed for one soft bowel movement daily)     docusate sodium (COLACE) 50 MG capsule Take 1 capsule (50 mg) by mouth 2 times daily as needed for constipation (always take if using oxycodone. Otherwise take as needed for one soft bowel movement daily)     ketorolac (TORADOL) 10 MG tablet Take 1 tablet (10 mg) by mouth every 6 hours as needed for moderate pain     ondansetron (ZOFRAN ODT) 4 MG ODT tab Take 1 tablet (4 mg) by mouth every 8 hours as needed for nausea (Patient not taking: Reported on 10/4/2021)     oxyCODONE (ROXICODONE) 5 MG tablet Take 1 tablet (5 mg) by mouth every 6 hours as needed for pain (Patient not taking: Reported on 10/19/2021)     tamsulosin (FLOMAX) 0.4 MG capsule Take 1 capsule (0.4 mg) by mouth daily     tamsulosin (FLOMAX) 0.4 MG capsule Take 1 capsule (0.4 mg) by mouth daily     tolterodine ER (DETROL LA) 2 MG 24 hr capsule Take 1 capsule (2 mg) by mouth daily (Patient not taking: Reported on 10/19/2021)     No current facility-administered medications for this visit.       Review of Systems:   ROS: 10 point ROS neg other than the symptoms noted above in the HPI.    Physical Exam:  B/P: Data Unavailable, T: Data Unavailable, P: Data Unavailable, R: Data Unavailable  Estimated body mass index is 29.84 kg/m  as calculated from the following:    Height as of 10/19/21: 1.829 m (6').    Weight as of 10/19/21: 99.8 kg (220 lb).  General Appearance Adult: Alert, no acute distress, oriented  Lungs: no respiratory distress, or pursed lip breathing  Neuro: Alert, oriented, speech and mentation normal  Psych: affect and mood normal

## 2022-01-27 ENCOUNTER — TELEPHONE (OUTPATIENT)
Dept: UROLOGY | Facility: CLINIC | Age: 47
End: 2022-01-27
Payer: COMMERCIAL

## 2022-01-27 NOTE — TELEPHONE ENCOUNTER
Nutrition Education Scheduling Outreach #1:    Call to patient to schedule. Left message with phone number to call to schedule.    Plan for 2nd outreach attempt within 1 week.    Esperanza Dos Santos  [facility]  Diabetes and Nutrition Scheduling

## 2022-02-03 NOTE — TELEPHONE ENCOUNTER
Nutrition Education Scheduling Outreach #2:    Call to patient to schedule. Left message with phone number to call to schedule.      Mario Castellanos  [facility]  Diabetes and Nutrition Scheduling

## 2022-02-28 NOTE — PROGRESS NOTES
Pt evaluated via billable telephone visit d/t COVID-19 restrictions. Time spent: 15 min    [facility]  Outpatient MNT     Time Spent: 15 minutes  Visit Type: Initial  Referring Physician: Pierre Meléndez  Reason for RD Visit: kidney stones  Pt accompanied by: self     Nutrition Assessment  Pt reports both he and his wife cook at home. They use minimal added salt via , but pt prefers to add more pepper and spice to his food.     11/17/21 litholink showed high urine Na and calcium. Repeat litho link in 2-3 months.     - Food allergies/intolerances: none  - Food access concerns: none     Vitamins, Supplements, Pertinent Meds: MVI  Herbal Medicines/Supplements: none     Weight hx: stable     Diet Recall  Breakfast Yogurt with granola and fruit    Lunch Salad w/ grilled chicken, apple, mandarin oranges + Icelandic/Ranch or poppyseed dressing or less often will order Chipotle bowl or grills chicken or burger    Dinner Steak, baked potato; pulled pork tacos; spaghetti (wife makes homemade sauce using tomato paste, etc)   Snacks Popcorn (microwave bag), some candy   Beverages Faustino morin (unsweetened iced tea), water (80+ oz/day), coffee (black)   Alcohol 1-2 drinks every few weeks (vodka cranberry or whiskey on ice)   Dining out 1-2x/week     Nutrition Diagnosis  No nutrition diagnosis identified at this time     Nutrition Intervention  Reviewed recommendations per litho link:  - Reduce dietary Na intake to 2000 mg/day. Keep track to understand current avg and where majority of Na is coming from. Provided resources on this. Consider swapping microwave popcorn for lightly salted pre-popped versions, looking online when dining out, looking for LS taco seasoning, swapping salad dressings, etc.   - Ensure adequate calcium intake. Either consume 3 servings calcium/day OR take calcium citrate supplement for 1200 mg/day    Patient Understanding: Pt verbalized understanding of education provided.  Expected Engagement:  Good  Follow-Up Plans: PRN     Nutrition Goals  No nutrition goals identified at this time    Marce Christian, RD, LD, CCTD

## 2022-03-01 ENCOUNTER — VIRTUAL VISIT (OUTPATIENT)
Dept: TRANSPLANT | Facility: CLINIC | Age: 47
End: 2022-03-01
Attending: UROLOGY
Payer: COMMERCIAL

## 2022-03-01 DIAGNOSIS — N20.0 CALCULUS OF KIDNEY: ICD-10-CM

## 2022-03-01 DIAGNOSIS — R82.994 HYPERCALCIURIA: ICD-10-CM

## 2022-03-01 DIAGNOSIS — Z71.3 DIETARY COUNSELING AND SURVEILLANCE: ICD-10-CM

## 2022-03-01 DIAGNOSIS — E87.0 HYPERNATRIURIA: ICD-10-CM

## 2022-03-01 PROCEDURE — 97802 MEDICAL NUTRITION INDIV IN: CPT | Mod: TEL,95 | Performed by: DIETITIAN, REGISTERED

## 2022-03-01 NOTE — LETTER
3/1/2022     RE: Jewel Madsen  [address]  [facility] [address]    Dear Colleague,    Thank you for referring your patient, Jewel Madsen, to the [facility]. Please see a copy of my visit note below.    Pt evaluated via billable telephone visit d/t COVID-19 restrictions. Time spent: 15 min    [facility]  Outpatient MNT     Time Spent: 15 minutes  Visit Type: Initial  Referring Physician: Pierre Meléndez  Reason for RD Visit: kidney stones  Pt accompanied by: self     Nutrition Assessment  Pt reports both he and his wife cook at home. They use minimal added salt via , but pt prefers to add more pepper and spice to his food.     11/17/21 litholink showed high urine Na and calcium. Repeat litho link in 2-3 months.     - Food allergies/intolerances: none  - Food access concerns: none     Vitamins, Supplements, Pertinent Meds: MVI  Herbal Medicines/Supplements: none     Weight hx: stable     Diet Recall  Breakfast Yogurt with granola and fruit    Lunch Salad w/ grilled chicken, apple, mandarin oranges + [place]/Ranch or poppyseed dressing or less often will order Chipotle bowl or grills chicken or burger    Dinner Steak, baked potato; pulled pork tacos; spaghetti (wife makes homemade sauce using tomato paste, etc)   Snacks Popcorn (microwave bag), some candy   Beverages Faustino morin (unsweetened iced tea), water (80+ oz/day), coffee (black)   Alcohol 1-2 drinks every few weeks (vodka cranberry or whiskey on ice)   Dining out 1-2x/week     Nutrition Diagnosis  No nutrition diagnosis identified at this time     Nutrition Intervention  Reviewed recommendations per litho link:  - Reduce dietary Na intake to 2000 mg/day. Keep track to understand current avg and where majority of Na is coming from. Provided resources on this. Consider swapping microwave popcorn for lightly salted pre-popped versions, looking online when dining out, looking for LS taco seasoning, swapping  salad dressings, etc.   - Ensure adequate calcium intake. Either consume 3 servings calcium/day OR take calcium citrate supplement for 1200 mg/day    Patient Understanding: Pt verbalized understanding of education provided.  Expected Engagement: Good  Follow-Up Plans: PRN     Nutrition Goals  No nutrition goals identified at this time    Marce Christian, FLORINDA, LD, CCTD

## 2022-03-01 NOTE — PATIENT INSTRUCTIONS
Marcio Bueno,    Sodium:  - I would keep track via pen and paper or the asmita '[employer]Saint' for ~1 week to see your current average. Your total daily budget is recommended at or just below 2000 mg/day.   - High sodium items to limit would be: processed/junk foods, deli meat, canned soup or other canned goods, marinades, take out, etc. Look online at restaurants for nutrition info to guide ordering based on sodium.   - Look on the side of the nutrition label for sodium, but be sure to make note of serving size at the top of the label. This helps give perspective based on how much you are eating.     Calcium:  - Recommend eating 3 servings of calcium/day: 1 serving = 8 oz milk, 5-6 oz yogurt, or 1-2 oz cheese. Take note cheese is higher in sodium, so I would do 1 serving/day of cheese but not more.   - If this is difficult to do on a consistent basis, I would start a calcium citrate supplement for a total of 1200 mg/day. Take 600 mg in the AM and 600 mg in the PM.     Please let me know if you have questions!  Marce

## 2022-04-22 ENCOUNTER — TRANSFERRED RECORDS (OUTPATIENT)
Dept: HEALTH INFORMATION MANAGEMENT | Facility: CLINIC | Age: 47
End: 2022-04-22
Payer: COMMERCIAL

## 2022-05-09 ENCOUNTER — VIRTUAL VISIT (OUTPATIENT)
Dept: UROLOGY | Facility: CLINIC | Age: 47
End: 2022-05-09
Payer: COMMERCIAL

## 2022-05-09 VITALS — WEIGHT: 235 LBS | HEIGHT: 72 IN | BODY MASS INDEX: 31.83 KG/M2

## 2022-05-09 DIAGNOSIS — N20.0 CALCULUS OF KIDNEY: Primary | ICD-10-CM

## 2022-05-09 PROBLEM — N20.1 URETERAL STONE: Status: RESOLVED | Noted: 2021-10-04 | Resolved: 2022-05-09

## 2022-05-09 PROCEDURE — 99213 OFFICE O/P EST LOW 20 MIN: CPT | Mod: GT | Performed by: UROLOGY

## 2022-05-09 ASSESSMENT — PAIN SCALES - GENERAL: PAINLEVEL: NO PAIN (0)

## 2022-05-09 NOTE — PROGRESS NOTES
Myles is a 46 year old who is being evaluated via a billable video visit.      How would you like to obtain your AVS? MyChart  If the video visit is dropped, the invitation should be resent by: Text to cell phone: [phone]  Will anyone else be joining your video visit? No      Video Start Time: 10:21 AM  Video-Visit Details    Type of service:  Video Visit    Video End Time:10:27 AM    Originating Location (pt. Location): Home    Distant Location (provider location):  [facility] [place]     Platform used for Video Visit: Tarik    Assessment & Plan   ASSESSMENT and PLAN  46 year old male here for reevaluation of nephrolithiasis and hypercalciuria and hypernatriuria.  He has corrected these metabolic issues with dietary changes    1. Calculus of kidney  Congratulated patient on correcting high urine calcium and sodium by dietary changes.  Is at lower risk for interval stone formation and will only recheck litholink in future if has stone growth.  Discussed that could increase citrate in diet to further reduce risk.    Will repeat imaging in 6 mo to ensure no stone growth    Plan:  -KUB and renal US in 6 mo with virtual return    Time spent: 20 minutes spent on the date of the encounter doing chart review, history and exam, documentation and further activities as noted above.    Pierre Meléndez MD   Urology  [facility]         Subjective     CHIEF COMPLAINT   It was my pleasure to see Jewel Madsen  who is a 46 year old male for follow-up of metabolic stone.      HPI   Jewel Madsen is a very pleasant 46 year old male who presents with a history of right URS on 10/6/2021 with stone composition of 30 % COM, 20% COD, 50% CAP .  24 hr urine had hypercalciuria and hypernatriuria, and referred him to dietician to work on these aspects.    Since last visit, no issues with stones or flank pain.  Made substantive changes to diet to reduce sodium.    Labs:  24 hr urine study from  4/22/2022:  + adequate collection  -- Low urine volume (3.3 L)  -- Hypercalciuria (Ca/Cr Ratio 163)  -- Hyperoxaluria  -- Hypocitraturia  -- Hypernatriuria  -- Aciduria  + Alkaluria  -- Hypomagnesuria     Objective     PHYSICAL EXAM  Patient is a 46 year old  male   Vitals: Height 1.829 m (6'), weight 106.6 kg (235 lb).  Body mass index is 31.87 kg/m .  Gen: No acute distress

## 2022-05-09 NOTE — NURSING NOTE
Chief Complaint   Patient presents with     Follow Up     Litholink     Patient is ready for a video visit    Janet Mendoza

## 2022-05-09 NOTE — LETTER
Date:May 10, 2022      Provider requested that no letter be sent. Do not send.       [facility]

## 2022-05-09 NOTE — LETTER
5/9/2022       RE: Jewel Madsen  [address]  [place] [facility] [place] [facility] [address]     Dear Colleague,    Thank you for referring your patient, Jewel Madsen, to the [facility] [place] at [facility]. Please see a copy of my visit note below.    Myles is a 46 year old who is being evaluated via a billable video visit.      How would you like to obtain your AVS? MyChart  If the video visit is dropped, the invitation should be resent by: Text to cell phone: [phone]  Will anyone else be joining your video visit? No      Video Start Time: 10:21 AM  Video-Visit Details    Type of service:  Video Visit    Video End Time:10:27 AM    Originating Location (pt. Location): Home    Distant Location (provider location):  [facility] [place]     Platform used for Video Visit: Tarik    Assessment & Plan   ASSESSMENT and PLAN  46 year old male here for reevaluation of nephrolithiasis and hypercalciuria and hypernatriuria.  He has corrected these metabolic issues with dietary changes    1. Calculus of kidney  Congratulated patient on correcting high urine calcium and sodium by dietary changes.  Is at lower risk for interval stone formation and will only recheck litholink in future if has stone growth.  Discussed that could increase citrate in diet to further reduce risk.    Will repeat imaging in 6 mo to ensure no stone growth    Plan:  -KUB and renal US in 6 mo with virtual return    Time spent: 20 minutes spent on the date of the encounter doing chart review, history and exam, documentation and further activities as noted above.    Pierre Meléndez MD   Urology  [facility]         Subjective     CHIEF COMPLAINT   It was my pleasure to see Jewel Madsen  who is a 46 year old male for follow-up of metabolic stone.      HPI   Jewel Madsen is a very pleasant 46 year old male who presents with a history of right URS on 10/6/2021  with stone composition of 30 % COM, 20% COD, 50% CAP .  24 hr urine had hypercalciuria and hypernatriuria, and referred him to dietician to work on these aspects.    Since last visit, no issues with stones or flank pain.  Made substantive changes to diet to reduce sodium.    Labs:  24 hr urine study from 4/22/2022:  + adequate collection  -- Low urine volume (3.3 L)  -- Hypercalciuria (Ca/Cr Ratio 163)  -- Hyperoxaluria  -- Hypocitraturia  -- Hypernatriuria  -- Aciduria  + Alkaluria  -- Hypomagnesuria     Objective     PHYSICAL EXAM  Patient is a 46 year old  male   Vitals: Height 1.829 m (6'), weight 106.6 kg (235 lb).  Body mass index is 31.87 kg/m .  Gen: No acute distress             Again, thank you for allowing me to participate in the care of your patient.      Sincerely,    Pierre Meléndez MD

## 2022-10-10 ENCOUNTER — HEALTH MAINTENANCE LETTER (OUTPATIENT)
Age: 47
End: 2022-10-10

## 2022-11-11 ENCOUNTER — OFFICE VISIT (OUTPATIENT)
Dept: INTERNAL MEDICINE | Facility: CLINIC | Age: 47
End: 2022-11-11
Payer: COMMERCIAL

## 2022-11-11 ENCOUNTER — HOSPITAL ENCOUNTER (OUTPATIENT)
Dept: GENERAL RADIOLOGY | Facility: CLINIC | Age: 47
Discharge: HOME | End: 2022-11-11
Attending: UROLOGY
Payer: COMMERCIAL

## 2022-11-11 ENCOUNTER — HOSPITAL ENCOUNTER (OUTPATIENT)
Dept: ULTRASOUND IMAGING | Facility: CLINIC | Age: 47
Discharge: HOME | End: 2022-11-11
Attending: UROLOGY
Payer: COMMERCIAL

## 2022-11-11 VITALS
BODY MASS INDEX: 32.09 KG/M2 | OXYGEN SATURATION: 98 % | DIASTOLIC BLOOD PRESSURE: 85 MMHG | RESPIRATION RATE: 12 BRPM | SYSTOLIC BLOOD PRESSURE: 120 MMHG | WEIGHT: 236.9 LBS | HEIGHT: 72 IN | TEMPERATURE: 98.8 F | HEART RATE: 100 BPM

## 2022-11-11 DIAGNOSIS — Z11.59 NEED FOR HEPATITIS C SCREENING TEST: ICD-10-CM

## 2022-11-11 DIAGNOSIS — Z13.220 SCREENING FOR HYPERLIPIDEMIA: ICD-10-CM

## 2022-11-11 DIAGNOSIS — Z12.11 SCREEN FOR COLON CANCER: ICD-10-CM

## 2022-11-11 DIAGNOSIS — N20.0 CALCULUS OF KIDNEY: ICD-10-CM

## 2022-11-11 DIAGNOSIS — Z00.00 ROUTINE GENERAL MEDICAL EXAMINATION AT A HEALTH CARE FACILITY: Primary | ICD-10-CM

## 2022-11-11 LAB
ERYTHROCYTE [DISTWIDTH] IN BLOOD BY AUTOMATED COUNT: 12.4 % (ref 10–15)
HCT VFR BLD AUTO: 46.9 % (ref 40–53)
HGB BLD-MCNC: 15.6 G/DL (ref 13.3–17.7)
MCH RBC QN AUTO: 30.1 PG (ref 26.5–33)
MCHC RBC AUTO-ENTMCNC: 33.3 G/DL (ref 31.5–36.5)
MCV RBC AUTO: 91 FL (ref 78–100)
PLATELET # BLD AUTO: 249 10E3/UL (ref 150–450)
RBC # BLD AUTO: 5.18 10E6/UL (ref 4.4–5.9)
WBC # BLD AUTO: 4.5 10E3/UL (ref 4–11)

## 2022-11-11 PROCEDURE — 76770 US EXAM ABDO BACK WALL COMP: CPT

## 2022-11-11 PROCEDURE — 86803 HEPATITIS C AB TEST: CPT

## 2022-11-11 PROCEDURE — 85027 COMPLETE CBC AUTOMATED: CPT

## 2022-11-11 PROCEDURE — 74018 RADEX ABDOMEN 1 VIEW: CPT

## 2022-11-11 PROCEDURE — 36415 COLL VENOUS BLD VENIPUNCTURE: CPT

## 2022-11-11 PROCEDURE — 80053 COMPREHEN METABOLIC PANEL: CPT

## 2022-11-11 PROCEDURE — 80061 LIPID PANEL: CPT

## 2022-11-11 PROCEDURE — 99386 PREV VISIT NEW AGE 40-64: CPT

## 2022-11-11 ASSESSMENT — ENCOUNTER SYMPTOMS
ARTHRALGIAS: 0
HEMATURIA: 0
FREQUENCY: 0
COUGH: 0
SHORTNESS OF BREATH: 0
CONSTIPATION: 0
PALPITATIONS: 0
DIARRHEA: 0
EYE PAIN: 0
CHILLS: 0
ABDOMINAL PAIN: 0
DIZZINESS: 0
DYSURIA: 0
SORE THROAT: 0
WEAKNESS: 0
NERVOUS/ANXIOUS: 0
JOINT SWELLING: 0
HEMATOCHEZIA: 0
HEADACHES: 0
FEVER: 0
HEARTBURN: 0
NAUSEA: 0
PARESTHESIAS: 0
MYALGIAS: 0

## 2022-11-11 NOTE — PROGRESS NOTES
SUBJECTIVE:   CC: Myles is an 47 year old who presents for preventative health visit.     Patient has been advised of split billing requirements and indicates understanding: Yes     Healthy Habits:     Getting at least 3 servings of Calcium per day:  Yes    Bi-annual eye exam:  Yes    Dental care twice a year:  Yes    Sleep apnea or symptoms of sleep apnea:  None    Diet:  Regular (no restrictions)    Frequency of exercise:  2-3 days/week    Duration of exercise:  30-45 minutes    Taking medications regularly:  Yes    Medication side effects:  None    PHQ-2 Total Score: 0    Additional concerns today:  No      Today's PHQ-2 Score:   PHQ-2 ( 1999 [employer]) 11/11/2022   Q1: Little interest or pleasure in doing things 0   Q2: Feeling down, depressed or hopeless 0   PHQ-2 Score 0   Q1: Little interest or pleasure in doing things Not at all   Q2: Feeling down, depressed or hopeless Not at all   PHQ-2 Score 0     Abuse: Current or Past(Physical, Sexual or Emotional)- No  Do you feel safe in your environment? Yes    Have you ever done Advance Care Planning? (For example, a Health Directive, POLST, or a discussion with a medical provider or your loved ones about your wishes): No, advance care planning information given to patient to review.  Advanced care planning was discussed at today's visit.    Social History     Tobacco Use     Smoking status: Never     Smokeless tobacco: Never   Substance Use Topics     Alcohol use: Yes     Comment: ocass     If you drink alcohol do you typically have >3 drinks per day or >7 drinks per week? No    Alcohol Use 11/11/2022   Prescreen: >3 drinks/day or >7 drinks/week? No   No flowsheet data found.    Last PSA: No results found for: PSA    Reviewed orders with patient. Reviewed health maintenance and updated orders accordingly - Yes  Lab work is in process    Reviewed and updated as needed this visit by clinical staff   Tobacco  Allergies  Meds   Med Hx  Surg Hx  Fam Hx  Soc Hx     "    Reviewed and updated as needed this visit by Provider                 Past Medical History:   Diagnosis Date     Kidney stone       Past Surgical History:   Procedure Laterality Date     LASER HOLMIUM LITHOTRIPSY URETER(S), INSERT STENT, COMBINED Right 10/6/2021    Procedure: RIGHT CYSTOURETEROSCOPY, WITH LITHOTRIPSY USING LASER AND URETERAL STENT INSERTION;  Surgeon: Pierre Meléndez MD;  Location: [facility] OR     NOSE SURGERY       TOE SURGERY         Review of Systems   Constitutional: Negative for chills and fever.   HENT: Negative for congestion, ear pain, hearing loss and sore throat.    Eyes: Negative for pain and visual disturbance.   Respiratory: Negative for cough and shortness of breath.    Cardiovascular: Negative for chest pain, palpitations and peripheral edema.   Gastrointestinal: Negative for abdominal pain, constipation, diarrhea, heartburn, hematochezia and nausea.   Genitourinary: Negative for dysuria, frequency, genital sores, hematuria, impotence, penile discharge and urgency.   Musculoskeletal: Negative for arthralgias, joint swelling and myalgias.   Skin: Negative for rash.   Neurological: Negative for dizziness, weakness, headaches and paresthesias.   Psychiatric/Behavioral: Negative for mood changes. The patient is not nervous/anxious.      Establishing care. Has not had preventative visit in years. No concerns today- recent kidney stone    OBJECTIVE:   /85 (BP Location: Left arm, Patient Position: Sitting, Cuff Size: Adult Large)   Pulse 100   Temp 98.8  F (37.1  C) (Tympanic)   Resp 12   Ht 1.816 m (5' 11.5\")   Wt 107.5 kg (236 lb 14.4 oz)   SpO2 98%   BMI 32.58 kg/m      Physical Exam  GENERAL: alert and no distress  EYES: Eyes grossly normal to inspection, PERRL and conjunctivae and sclerae normal  HENT: ear canals and TM's normal, nose and mouth without ulcers or lesions  NECK: no adenopathy, no asymmetry, masses, or scars and thyroid normal to palpation  RESP: lungs clear " "to auscultation - no rales, rhonchi or wheezes  CV: regular rate and rhythm, normal S1 S2, no S3 or S4, no murmur, click or rub, no peripheral edema and peripheral pulses strong  ABDOMEN: soft, nontender, no hepatosplenomegaly, no masses and bowel sounds normal  MS: no gross musculoskeletal defects noted, no edema  SKIN: no suspicious lesions or rashes  NEURO: Normal strength and tone, mentation intact and speech normal  PSYCH: mentation appears normal, affect normal/bright    Diagnostic Test Results:  Labs reviewed in Epic    ASSESSMENT/PLAN:   (Z00.00) Routine general medical examination at a health care facility  (primary encounter diagnosis)  Comment: routine physical,   Plan: CBC with platelets, Comprehensive metabolic         panel (BMP + Alb, Alk Phos, ALT, AST, Total.         Bili, TP)            (Z12.11) Screen for colon cancer  Comment: screen  Plan: COLOGUARD([facility])            (Z11.59) Need for hepatitis C screening test  Comment: Once in a lifetime testing   Plan: Hepatitis C Screen Reflex to HCV RNA Quant and         Genotype            (Z13.220) Screening for hyperlipidemia  Comment: Screening baseline  Plan: Lipid panel reflex to direct LDL Non-fasting    COUNSELING:   Reviewed preventive health counseling, as reflected in patient instructions    Estimated body mass index is 32.58 kg/m  as calculated from the following:    Height as of this encounter: 1.816 m (5' 11.5\").    Weight as of this encounter: 107.5 kg (236 lb 14.4 oz).     Weight management plan: Discussed healthy diet and exercise guidelines    He reports that he has never smoked. He has never used smokeless tobacco.    Counseling Resources:  ATP IV Guidelines  Pooled Cohorts Equation Calculator  FRAX Risk Assessment  ICSI Preventive Guidelines  Dietary Guidelines for Americans, 201  USDA's MyPlate  ASA Prophylaxis  Lung CA Screening    Abbe Ruby NP  [facility]  "

## 2022-11-12 LAB
ALBUMIN SERPL-MCNC: 4.3 G/DL (ref 3.4–5)
ALP SERPL-CCNC: 83 U/L (ref 40–150)
ALT SERPL W P-5'-P-CCNC: 30 U/L (ref 0–70)
ANION GAP SERPL CALCULATED.3IONS-SCNC: 6 MMOL/L (ref 3–14)
AST SERPL W P-5'-P-CCNC: 22 U/L (ref 0–45)
BILIRUB SERPL-MCNC: 1.7 MG/DL (ref 0.2–1.3)
BUN SERPL-MCNC: 16 MG/DL (ref 7–30)
CALCIUM SERPL-MCNC: 9.5 MG/DL (ref 8.5–10.1)
CHLORIDE BLD-SCNC: 105 MMOL/L (ref 94–109)
CHOLEST SERPL-MCNC: 163 MG/DL
CO2 SERPL-SCNC: 28 MMOL/L (ref 20–32)
CREAT SERPL-MCNC: 1 MG/DL (ref 0.66–1.25)
FASTING STATUS PATIENT QL REPORTED: NO
GFR SERPL CREATININE-BSD FRML MDRD: >90 ML/MIN/1.73M2
GLUCOSE BLD-MCNC: 92 MG/DL (ref 70–99)
HDLC SERPL-MCNC: 49 MG/DL
LDLC SERPL CALC-MCNC: 96 MG/DL
NONHDLC SERPL-MCNC: 114 MG/DL
POTASSIUM BLD-SCNC: 4.6 MMOL/L (ref 3.4–5.3)
PROT SERPL-MCNC: 7.3 G/DL (ref 6.8–8.8)
SODIUM SERPL-SCNC: 139 MMOL/L (ref 133–144)
TRIGL SERPL-MCNC: 90 MG/DL

## 2022-11-14 LAB — HCV AB SERPL QL IA: NONREACTIVE

## 2022-11-21 ENCOUNTER — VIRTUAL VISIT (OUTPATIENT)
Dept: UROLOGY | Facility: CLINIC | Age: 47
End: 2022-11-21
Payer: COMMERCIAL

## 2022-11-21 VITALS — WEIGHT: 235 LBS | BODY MASS INDEX: 32.32 KG/M2

## 2022-11-21 DIAGNOSIS — N20.0 CALCIUM KIDNEY STONE: Primary | ICD-10-CM

## 2022-11-21 PROCEDURE — 99213 OFFICE O/P EST LOW 20 MIN: CPT | Mod: GT | Performed by: UROLOGY

## 2022-11-21 ASSESSMENT — PAIN SCALES - GENERAL: PAINLEVEL: NO PAIN (0)

## 2022-11-21 NOTE — PROGRESS NOTES
Myles is a 47 year old who is being evaluated via a billable video visit.      How would you like to obtain your AVS? MyChart  If the video visit is dropped, the invitation should be resent by: cell  Will anyone else be joining your video visit? No      Assessment & Plan   ASSESSMENT and PLAN  47 year old male here for reevaluation of mixed calcium nephrolithiasis with history of hyper nocturia and hypercalciuria fixed with dietary measures.  No new stone issues and is continuing his low-sodium diet.    1. Calcium kidney stone  Doing well from a stone perspective without any new stone episodes or concerning symptoms.  Since he is maintaining his diet, will hold off on another Litholink.    Plan:  -KUB and renal ultrasound in 1 year.  If no stones at that time, can follow-up as needed.  If stones at that time, repeat a 24-hour urine study.    Time spent: 10 minutes spent on the date of the encounter doing chart review, history and exam, documentation and further activities as noted above.    Pierre Meléndez MD   Urology  [facility]         Subjective     CHIEF COMPLAINT   follow-up of nephrolithiasis.      HPI   Jewel Madsen is a very pleasant 47 year old male who presents with a history of nephrolithiasis status post right ureteroscopy October 2021 with mixed calcium stone component.  He had hyper nocturia with secondary hypercalciuria on 24-hour urinalysis that was corrected with dietary measures. .    Since last visit, no issues with flank pain, hematuria or stone passage.      Still on low sodium diet.      Imaging on November 11, 2022 (images personally reviewed):  KUB without stones  Renal ultrasound without stones or hydronephrosis       Objective     PHYSICAL EXAM  Patient is a 47 year old  male   Vitals: Weight 106.6 kg (235 lb).  Body mass index is 32.32 kg/m .  Enteral: No acute distress         Video-Visit Details    Video Start Time: 9:58 AM    Type of service:  Video  Visit    Video End Time:10:01 AM    Originating Location (pt. Location): Home    Distant Location (provider location):  On-site    Platform used for Video Visit: Tarik

## 2022-11-21 NOTE — LETTER
11/21/2022       RE: Jewel Madsen  [address]  [place] [address]     Dear Colleague,    Thank you for referring your patient, Jewel Madsen, to the [facility] [place] at [facility]. Please see a copy of my visit note below.    Myles is a 47 year old who is being evaluated via a billable video visit.      How would you like to obtain your AVS? MyChart  If the video visit is dropped, the invitation should be resent by: cell  Will anyone else be joining your video visit? No      Assessment & Plan   ASSESSMENT and PLAN  47 year old male here for reevaluation of mixed calcium nephrolithiasis with history of hyper nocturia and hypercalciuria fixed with dietary measures.  No new stone issues and is continuing his low-sodium diet.    1. Calcium kidney stone  Doing well from a stone perspective without any new stone episodes or concerning symptoms.  Since he is maintaining his diet, will hold off on another Litholink.    Plan:  -KUB and renal ultrasound in 1 year.  If no stones at that time, can follow-up as needed.  If stones at that time, repeat a 24-hour urine study.    Time spent: 10 minutes spent on the date of the encounter doing chart review, history and exam, documentation and further activities as noted above.    Pierre Meléndez MD   Urology  [facility]        Subjective     CHIEF COMPLAINT   follow-up of nephrolithiasis.      HPI   Jewel Madsen is a very pleasant 47 year old male who presents with a history of nephrolithiasis status post right ureteroscopy October 2021 with mixed calcium stone component.  He had hyper nocturia with secondary hypercalciuria on 24-hour urinalysis that was corrected with dietary measures. .    Since last visit, no issues with flank pain, hematuria or stone passage.      Still on low sodium diet.      Imaging on November 11, 2022 (images personally reviewed):  KUB without  stones  Renal ultrasound without stones or hydronephrosis      Objective     PHYSICAL EXAM  Patient is a 47 year old  male   Vitals: Weight 106.6 kg (235 lb).  Body mass index is 32.32 kg/m .  Enteral: No acute distress        Video-Visit Details    Video Start Time: 9:58 AM    Type of service:  Video Visit    Video End Time:10:01 AM    Originating Location (pt. Location): Home    Distant Location (provider location):  On-site    Platform used for Video Visit: Tarik

## 2022-12-08 LAB — NONINV COLON CA DNA+OCC BLD SCRN STL QL: NEGATIVE

## 2023-11-21 ENCOUNTER — HOSPITAL ENCOUNTER (OUTPATIENT)
Dept: GENERAL RADIOLOGY | Facility: CLINIC | Age: 48
Discharge: HOME | End: 2023-11-21
Attending: UROLOGY
Payer: COMMERCIAL

## 2023-11-21 ENCOUNTER — HOSPITAL ENCOUNTER (OUTPATIENT)
Dept: ULTRASOUND IMAGING | Facility: CLINIC | Age: 48
Discharge: HOME | End: 2023-11-21
Attending: UROLOGY
Payer: COMMERCIAL

## 2023-11-21 DIAGNOSIS — N20.0 CALCIUM KIDNEY STONE: ICD-10-CM

## 2023-11-21 PROCEDURE — 74018 RADEX ABDOMEN 1 VIEW: CPT

## 2023-11-21 PROCEDURE — 76770 US EXAM ABDO BACK WALL COMP: CPT

## 2024-01-07 ENCOUNTER — HEALTH MAINTENANCE LETTER (OUTPATIENT)
Age: 49
End: 2024-01-07

## 2024-10-08 ENCOUNTER — APPOINTMENT (OUTPATIENT)
Dept: URBAN - METROPOLITAN AREA CLINIC 253 | Age: 49
Setting detail: DERMATOLOGY
End: 2024-10-08

## 2024-10-08 VITALS — RESPIRATION RATE: 14 BRPM | WEIGHT: 240 LBS | HEIGHT: 72 IN

## 2024-10-08 DIAGNOSIS — D18.0 HEMANGIOMA: ICD-10-CM

## 2024-10-08 DIAGNOSIS — L82.1 OTHER SEBORRHEIC KERATOSIS: ICD-10-CM

## 2024-10-08 DIAGNOSIS — Z71.89 OTHER SPECIFIED COUNSELING: ICD-10-CM

## 2024-10-08 DIAGNOSIS — L57.0 ACTINIC KERATOSIS: ICD-10-CM

## 2024-10-08 DIAGNOSIS — L57.8 OTHER SKIN CHANGES DUE TO CHRONIC EXPOSURE TO NONIONIZING RADIATION: ICD-10-CM

## 2024-10-08 DIAGNOSIS — D22 MELANOCYTIC NEVI: ICD-10-CM

## 2024-10-08 PROBLEM — D22.71 MELANOCYTIC NEVI OF RIGHT LOWER LIMB, INCLUDING HIP: Status: ACTIVE | Noted: 2024-10-08

## 2024-10-08 PROBLEM — D22.62 MELANOCYTIC NEVI OF LEFT UPPER LIMB, INCLUDING SHOULDER: Status: ACTIVE | Noted: 2024-10-08

## 2024-10-08 PROBLEM — D22.5 MELANOCYTIC NEVI OF TRUNK: Status: ACTIVE | Noted: 2024-10-08

## 2024-10-08 PROBLEM — D22.61 MELANOCYTIC NEVI OF RIGHT UPPER LIMB, INCLUDING SHOULDER: Status: ACTIVE | Noted: 2024-10-08

## 2024-10-08 PROBLEM — D18.01 HEMANGIOMA OF SKIN AND SUBCUTANEOUS TISSUE: Status: ACTIVE | Noted: 2024-10-08

## 2024-10-08 PROBLEM — D22.72 MELANOCYTIC NEVI OF LEFT LOWER LIMB, INCLUDING HIP: Status: ACTIVE | Noted: 2024-10-08

## 2024-10-08 PROCEDURE — OTHER SUNSCREEN RECOMMENDATIONS: OTHER

## 2024-10-08 PROCEDURE — OTHER PATIENT SPECIFIC COUNSELING: OTHER

## 2024-10-08 PROCEDURE — OTHER MIPS QUALITY: OTHER

## 2024-10-08 PROCEDURE — OTHER COUNSELING: OTHER

## 2024-10-08 PROCEDURE — 17003 DESTRUCT PREMALG LES 2-14: CPT

## 2024-10-08 PROCEDURE — 99203 OFFICE O/P NEW LOW 30 MIN: CPT | Mod: 25

## 2024-10-08 PROCEDURE — 17000 DESTRUCT PREMALG LESION: CPT

## 2024-10-08 PROCEDURE — OTHER LIQUID NITROGEN: OTHER

## 2024-10-08 ASSESSMENT — LOCATION SIMPLE DESCRIPTION DERM
LOCATION SIMPLE: LEFT FOREARM
LOCATION SIMPLE: LEFT UPPER ARM
LOCATION SIMPLE: LEFT UPPER BACK
LOCATION SIMPLE: LEFT CHEEK
LOCATION SIMPLE: ABDOMEN
LOCATION SIMPLE: UPPER BACK
LOCATION SIMPLE: RIGHT TEMPLE
LOCATION SIMPLE: LEFT LOWER BACK
LOCATION SIMPLE: RIGHT THIGH
LOCATION SIMPLE: CHEST
LOCATION SIMPLE: RIGHT FOREARM
LOCATION SIMPLE: LEFT THIGH
LOCATION SIMPLE: RIGHT CHEEK

## 2024-10-08 ASSESSMENT — LOCATION DETAILED DESCRIPTION DERM
LOCATION DETAILED: LEFT INFERIOR CENTRAL MALAR CHEEK
LOCATION DETAILED: RIGHT SUPERIOR CENTRAL MALAR CHEEK
LOCATION DETAILED: RIGHT ANTERIOR DISTAL THIGH
LOCATION DETAILED: EPIGASTRIC SKIN
LOCATION DETAILED: RIGHT VENTRAL DISTAL FOREARM
LOCATION DETAILED: MIDDLE STERNUM
LOCATION DETAILED: LEFT VENTRAL PROXIMAL FOREARM
LOCATION DETAILED: RIGHT INFERIOR TEMPLE
LOCATION DETAILED: LEFT ANTERIOR DISTAL THIGH
LOCATION DETAILED: LEFT INFERIOR LATERAL MIDBACK
LOCATION DETAILED: LEFT DISTAL DORSAL FOREARM
LOCATION DETAILED: RIGHT ANTERIOR PROXIMAL THIGH
LOCATION DETAILED: RIGHT SUPERIOR LATERAL MALAR CHEEK
LOCATION DETAILED: LEFT ANTERIOR PROXIMAL THIGH
LOCATION DETAILED: RIGHT PROXIMAL DORSAL FOREARM
LOCATION DETAILED: LEFT MEDIAL UPPER BACK
LOCATION DETAILED: RIGHT VENTRAL PROXIMAL FOREARM
LOCATION DETAILED: LEFT VENTRAL DISTAL FOREARM
LOCATION DETAILED: INFERIOR THORACIC SPINE
LOCATION DETAILED: LEFT ANTECUBITAL SKIN

## 2024-10-08 ASSESSMENT — LOCATION ZONE DERM
LOCATION ZONE: FACE
LOCATION ZONE: ARM
LOCATION ZONE: TRUNK
LOCATION ZONE: LEG

## 2024-10-08 NOTE — PROCEDURE: LIQUID NITROGEN
Duration Of Freeze Thaw-Cycle (Seconds): 2
Show Applicator Variable?: Yes
Detail Level: Detailed
Consent: The patient's consent was obtained including but not limited to risks of crusting, scabbing, blistering, scarring, darker or lighter pigmentary change, recurrence, incomplete removal and infection.
Number Of Freeze-Thaw Cycles: 2 freeze-thaw cycles
Post-Care Instructions: I reviewed with the patient in detail post-care instructions. Patient is to wear sunprotection, and avoid picking at any of the treated lesions. Pt may apply Vaseline to crusted or scabbing areas.
Application Tool (Optional): Liquid Nitrogen Sprayer
Render Note In Bullet Format When Appropriate: No

## 2024-10-08 NOTE — HPI: FULL BODY SKIN EXAMINATION
What Is The Reason For Today's Visit?: Full Body Skin Examination
Additional History: He reports a rough spot at the corner of the right eye.

## 2025-01-25 ENCOUNTER — HEALTH MAINTENANCE LETTER (OUTPATIENT)
Age: 50
End: 2025-01-25

## (undated) DEVICE — TUBING SET THERMEDX UROLOGY SGL USE LL0006

## (undated) DEVICE — SPECIMEN CONTAINER 5OZ STERILE 2600SA

## (undated) DEVICE — PAD CHUX UNDERPAD 30X30"

## (undated) DEVICE — BASKET NITINOL TIPLESS HALO  1.5FRX120CM 554120

## (undated) DEVICE — LINEN TOWEL PACK X5 5464

## (undated) DEVICE — GLOVE PROTEXIS W/NEU-THERA 7.0  2D73TE70

## (undated) DEVICE — PACK CYSTO CUSTOM ASC

## (undated) DEVICE — CATH URETERAL OPEN END 5FRX70CM M0064002010

## (undated) DEVICE — KIT ENDO FIRST STEP DISINFECTANT 200ML W/POUCH EP-4

## (undated) DEVICE — Device

## (undated) DEVICE — GUIDEWIRE SENSOR DUAL FLEX STR 0.035"X150CM M0066703080

## (undated) DEVICE — LASER FIBER HOLMIUM FLEXIVA 200UM M0068403910 840-391

## (undated) DEVICE — DRAPE C-ARM W/STRAPS 42X72" 07-CA104

## (undated) DEVICE — SHEATH URETERAL ACCESS NAVIGATOR HD 11/13FRX46CM M0062502230

## (undated) DEVICE — RAD RX CONRAY 60% (50ML) CHARGE PER ML

## (undated) DEVICE — CATH URETERAL DUAL LUMEN 10FRX54CM M0064051000

## (undated) DEVICE — SOL NACL 0.9% IRRIG 3000ML BAG 2B7477

## (undated) RX ORDER — LIDOCAINE HYDROCHLORIDE 20 MG/ML
INJECTION, SOLUTION EPIDURAL; INFILTRATION; INTRACAUDAL; PERINEURAL
Status: DISPENSED
Start: 2021-10-06

## (undated) RX ORDER — PROPOFOL 10 MG/ML
INJECTION, EMULSION INTRAVENOUS
Status: DISPENSED
Start: 2021-10-06

## (undated) RX ORDER — DEXAMETHASONE SODIUM PHOSPHATE 4 MG/ML
INJECTION, SOLUTION INTRA-ARTICULAR; INTRALESIONAL; INTRAMUSCULAR; INTRAVENOUS; SOFT TISSUE
Status: DISPENSED
Start: 2021-10-06

## (undated) RX ORDER — ONDANSETRON 2 MG/ML
INJECTION INTRAMUSCULAR; INTRAVENOUS
Status: DISPENSED
Start: 2021-10-06

## (undated) RX ORDER — ATROPA BELLADONNA AND OPIUM 16.2; 3 MG/1; MG/1
SUPPOSITORY RECTAL
Status: DISPENSED
Start: 2021-10-06

## (undated) RX ORDER — CEFAZOLIN SODIUM 1 G/3ML
INJECTION, POWDER, FOR SOLUTION INTRAMUSCULAR; INTRAVENOUS
Status: DISPENSED
Start: 2021-10-06

## (undated) RX ORDER — ACETAMINOPHEN 325 MG/1
TABLET ORAL
Status: DISPENSED
Start: 2021-10-06

## (undated) RX ORDER — GABAPENTIN 300 MG/1
CAPSULE ORAL
Status: DISPENSED
Start: 2021-10-06

## (undated) RX ORDER — FENTANYL CITRATE 50 UG/ML
INJECTION, SOLUTION INTRAMUSCULAR; INTRAVENOUS
Status: DISPENSED
Start: 2021-10-06

## (undated) RX ORDER — KETOROLAC TROMETHAMINE 30 MG/ML
INJECTION, SOLUTION INTRAMUSCULAR; INTRAVENOUS
Status: DISPENSED
Start: 2021-10-06